# Patient Record
Sex: FEMALE | Race: WHITE | NOT HISPANIC OR LATINO
[De-identification: names, ages, dates, MRNs, and addresses within clinical notes are randomized per-mention and may not be internally consistent; named-entity substitution may affect disease eponyms.]

---

## 2017-10-02 PROBLEM — Z00.00 ENCOUNTER FOR PREVENTIVE HEALTH EXAMINATION: Status: ACTIVE | Noted: 2017-10-02

## 2017-10-17 ENCOUNTER — APPOINTMENT (OUTPATIENT)
Dept: CARDIOLOGY | Facility: CLINIC | Age: 82
End: 2017-10-17

## 2017-11-02 ENCOUNTER — APPOINTMENT (OUTPATIENT)
Age: 82
End: 2017-11-02
Payer: MEDICARE

## 2017-11-02 ENCOUNTER — NON-APPOINTMENT (OUTPATIENT)
Age: 82
End: 2017-11-02

## 2017-11-02 VITALS
WEIGHT: 131 LBS | DIASTOLIC BLOOD PRESSURE: 70 MMHG | OXYGEN SATURATION: 98 % | SYSTOLIC BLOOD PRESSURE: 120 MMHG | BODY MASS INDEX: 25.72 KG/M2 | HEIGHT: 60 IN | HEART RATE: 90 BPM

## 2017-11-02 DIAGNOSIS — Z82.3 FAMILY HISTORY OF STROKE: ICD-10-CM

## 2017-11-02 DIAGNOSIS — K21.9 GASTRO-ESOPHAGEAL REFLUX DISEASE W/OUT ESOPHAGITIS: ICD-10-CM

## 2017-11-02 DIAGNOSIS — I10 ESSENTIAL (PRIMARY) HYPERTENSION: ICD-10-CM

## 2017-11-02 DIAGNOSIS — I35.0 NONRHEUMATIC AORTIC (VALVE) STENOSIS: ICD-10-CM

## 2017-11-02 DIAGNOSIS — E78.2 MIXED HYPERLIPIDEMIA: ICD-10-CM

## 2017-11-02 PROCEDURE — 93306 TTE W/DOPPLER COMPLETE: CPT

## 2017-11-02 PROCEDURE — 99204 OFFICE O/P NEW MOD 45 MIN: CPT

## 2017-11-02 PROCEDURE — 93000 ELECTROCARDIOGRAM COMPLETE: CPT

## 2017-11-02 RX ORDER — LABETALOL HYDROCHLORIDE 200 MG/1
200 TABLET, FILM COATED ORAL
Refills: 0 | Status: ACTIVE | COMMUNITY

## 2017-11-02 RX ORDER — SIMVASTATIN 40 MG/1
40 TABLET, FILM COATED ORAL DAILY
Refills: 0 | Status: ACTIVE | COMMUNITY

## 2017-11-02 RX ORDER — AZITHROMYCIN 250 MG/1
250 TABLET, FILM COATED ORAL
Qty: 6 | Refills: 0 | Status: ACTIVE | COMMUNITY
Start: 2017-09-06

## 2017-11-02 RX ORDER — ALISKIREN HEMIFUMARATE 150 MG/1
150 TABLET, FILM COATED ORAL
Qty: 30 | Refills: 0 | Status: ACTIVE | COMMUNITY
Start: 2017-08-07

## 2017-11-06 ENCOUNTER — APPOINTMENT (OUTPATIENT)
Age: 82
End: 2017-11-06

## 2019-03-05 ENCOUNTER — EMERGENCY (EMERGENCY)
Facility: HOSPITAL | Age: 84
LOS: 1 days | Discharge: HOME | End: 2019-03-05
Attending: EMERGENCY MEDICINE
Payer: MEDICARE

## 2019-03-05 VITALS
DIASTOLIC BLOOD PRESSURE: 84 MMHG | RESPIRATION RATE: 16 BRPM | HEIGHT: 61 IN | TEMPERATURE: 98 F | SYSTOLIC BLOOD PRESSURE: 150 MMHG | HEART RATE: 84 BPM | WEIGHT: 130.07 LBS | OXYGEN SATURATION: 100 %

## 2019-03-05 DIAGNOSIS — R10.9 UNSPECIFIED ABDOMINAL PAIN: ICD-10-CM

## 2019-03-05 DIAGNOSIS — Z90.49 ACQUIRED ABSENCE OF OTHER SPECIFIED PARTS OF DIGESTIVE TRACT: ICD-10-CM

## 2019-03-05 DIAGNOSIS — I10 ESSENTIAL (PRIMARY) HYPERTENSION: ICD-10-CM

## 2019-03-05 DIAGNOSIS — R14.2 ERUCTATION: ICD-10-CM

## 2019-03-05 DIAGNOSIS — N39.0 URINARY TRACT INFECTION, SITE NOT SPECIFIED: ICD-10-CM

## 2019-03-05 LAB
ALBUMIN SERPL ELPH-MCNC: 3.8 G/DL — SIGNIFICANT CHANGE UP (ref 3.3–5)
ALP SERPL-CCNC: 114 U/L — SIGNIFICANT CHANGE UP (ref 40–120)
ALT FLD-CCNC: 17 U/L — SIGNIFICANT CHANGE UP (ref 12–78)
AMYLASE P1 CFR SERPL: 48 U/L — SIGNIFICANT CHANGE UP (ref 25–115)
ANION GAP SERPL CALC-SCNC: 10 MMOL/L — SIGNIFICANT CHANGE UP (ref 5–17)
APPEARANCE UR: CLEAR — SIGNIFICANT CHANGE UP
AST SERPL-CCNC: 23 U/L — SIGNIFICANT CHANGE UP (ref 15–37)
BASOPHILS # BLD AUTO: 0.04 K/UL — SIGNIFICANT CHANGE UP (ref 0–0.2)
BASOPHILS NFR BLD AUTO: 0.3 % — SIGNIFICANT CHANGE UP (ref 0–2)
BILIRUB SERPL-MCNC: 1.2 MG/DL — SIGNIFICANT CHANGE UP (ref 0.2–1.2)
BILIRUB UR-MCNC: NEGATIVE — SIGNIFICANT CHANGE UP
BUN SERPL-MCNC: 34 MG/DL — HIGH (ref 7–23)
CALCIUM SERPL-MCNC: 9.3 MG/DL — SIGNIFICANT CHANGE UP (ref 8.5–10.1)
CHLORIDE SERPL-SCNC: 100 MMOL/L — SIGNIFICANT CHANGE UP (ref 96–108)
CO2 SERPL-SCNC: 29 MMOL/L — SIGNIFICANT CHANGE UP (ref 22–31)
COLOR SPEC: YELLOW — SIGNIFICANT CHANGE UP
CREAT SERPL-MCNC: 1.23 MG/DL — SIGNIFICANT CHANGE UP (ref 0.5–1.3)
DIFF PNL FLD: ABNORMAL
EOSINOPHIL # BLD AUTO: 0.01 K/UL — SIGNIFICANT CHANGE UP (ref 0–0.5)
EOSINOPHIL NFR BLD AUTO: 0.1 % — SIGNIFICANT CHANGE UP (ref 0–6)
GLUCOSE SERPL-MCNC: 120 MG/DL — HIGH (ref 70–99)
GLUCOSE UR QL: NEGATIVE MG/DL — SIGNIFICANT CHANGE UP
HCT VFR BLD CALC: 45.6 % — HIGH (ref 34.5–45)
HGB BLD-MCNC: 14.4 G/DL — SIGNIFICANT CHANGE UP (ref 11.5–15.5)
IMM GRANULOCYTES NFR BLD AUTO: 0.4 % — SIGNIFICANT CHANGE UP (ref 0–1.5)
KETONES UR-MCNC: ABNORMAL
LACTATE SERPL-SCNC: 1.9 MMOL/L — SIGNIFICANT CHANGE UP (ref 0.7–2)
LEUKOCYTE ESTERASE UR-ACNC: ABNORMAL
LIDOCAIN IGE QN: 71 U/L — LOW (ref 73–393)
LYMPHOCYTES # BLD AUTO: 1.27 K/UL — SIGNIFICANT CHANGE UP (ref 1–3.3)
LYMPHOCYTES # BLD AUTO: 9.4 % — LOW (ref 13–44)
MCHC RBC-ENTMCNC: 27.1 PG — SIGNIFICANT CHANGE UP (ref 27–34)
MCHC RBC-ENTMCNC: 31.6 GM/DL — LOW (ref 32–36)
MCV RBC AUTO: 85.7 FL — SIGNIFICANT CHANGE UP (ref 80–100)
MONOCYTES # BLD AUTO: 1.12 K/UL — HIGH (ref 0–0.9)
MONOCYTES NFR BLD AUTO: 8.2 % — SIGNIFICANT CHANGE UP (ref 2–14)
NEUTROPHILS # BLD AUTO: 11.08 K/UL — HIGH (ref 1.8–7.4)
NEUTROPHILS NFR BLD AUTO: 81.6 % — HIGH (ref 43–77)
NITRITE UR-MCNC: NEGATIVE — SIGNIFICANT CHANGE UP
NRBC # BLD: 0 /100 WBCS — SIGNIFICANT CHANGE UP (ref 0–0)
PH UR: 5 — SIGNIFICANT CHANGE UP (ref 5–8)
PLATELET # BLD AUTO: 258 K/UL — SIGNIFICANT CHANGE UP (ref 150–400)
POTASSIUM SERPL-MCNC: 5 MMOL/L — SIGNIFICANT CHANGE UP (ref 3.5–5.3)
POTASSIUM SERPL-SCNC: 5 MMOL/L — SIGNIFICANT CHANGE UP (ref 3.5–5.3)
PROT SERPL-MCNC: 8 GM/DL — SIGNIFICANT CHANGE UP (ref 6–8.3)
PROT UR-MCNC: 100 MG/DL
RBC # BLD: 5.32 M/UL — HIGH (ref 3.8–5.2)
RBC # FLD: 14.2 % — SIGNIFICANT CHANGE UP (ref 10.3–14.5)
SODIUM SERPL-SCNC: 139 MMOL/L — SIGNIFICANT CHANGE UP (ref 135–145)
SP GR SPEC: 1.02 — SIGNIFICANT CHANGE UP (ref 1.01–1.02)
UROBILINOGEN FLD QL: NEGATIVE MG/DL — SIGNIFICANT CHANGE UP
WBC # BLD: 13.58 K/UL — HIGH (ref 3.8–10.5)
WBC # FLD AUTO: 13.58 K/UL — HIGH (ref 3.8–10.5)

## 2019-03-05 PROCEDURE — 99284 EMERGENCY DEPT VISIT MOD MDM: CPT

## 2019-03-05 RX ORDER — PANTOPRAZOLE SODIUM 20 MG/1
40 TABLET, DELAYED RELEASE ORAL ONCE
Qty: 0 | Refills: 0 | Status: COMPLETED | OUTPATIENT
Start: 2019-03-05 | End: 2019-03-05

## 2019-03-05 RX ORDER — ONDANSETRON 8 MG/1
4 TABLET, FILM COATED ORAL ONCE
Qty: 0 | Refills: 0 | Status: COMPLETED | OUTPATIENT
Start: 2019-03-05 | End: 2019-03-05

## 2019-03-05 RX ADMIN — Medication 30 MILLILITER(S): at 20:13

## 2019-03-05 RX ADMIN — ONDANSETRON 4 MILLIGRAM(S): 8 TABLET, FILM COATED ORAL at 20:14

## 2019-03-05 RX ADMIN — PANTOPRAZOLE SODIUM 40 MILLIGRAM(S): 20 TABLET, DELAYED RELEASE ORAL at 20:14

## 2019-03-05 NOTE — ED ADULT NURSE REASSESSMENT NOTE - NS ED NURSE REASSESS COMMENT FT1
observed in waiting area sitting with   condition unchanged. Pt waiting for placement in treatment area

## 2019-03-05 NOTE — ED PROVIDER NOTE - PHYSICAL EXAMINATION
Gen: Alert, NAD, well appearing  Head: NC, AT, PERRL, EOMI, normal lids/conjunctiva  ENT: normal hearing, patent oropharynx without erythema/exudate, uvula midline  Neck: +supple, no tenderness/meningismus/JVD, +Trachea midline  Pulm: Bilateral BS, normal resp effort, no wheeze/stridor/retractions  CV: RRR, slight systolic murmur, +dist pulses  Abd: soft, +mild generalized tenderness, no guarding, ND, +BS, no palpable masses  Mskel: no edema/erythema/cyanosis  Skin: no rash, warm/dry  Neuro: AAOx3, no apparent sensory/motor deficits, coordination intact

## 2019-03-05 NOTE — ED PROVIDER NOTE - CLINICAL SUMMARY MEDICAL DECISION MAKING FREE TEXT BOX
Patient present for abdominal discomfort.  VSS.  Patients labs show mild leukocytosis and UTI.  PAtient's pain completely resolved with maalox.  She now declines CT imaging, states that she wants to go.  Discussed obtaining CT imaging without contrast if she wants but she still refuses CT.  Will treat for UTI.   The patient has decided to leave against medical advice.  The patient is AAOx3, not intoxicated, and displays normal decision making ability. We discussed all risks, benefits, and alternatives to the progression of treatment and the potential dangers of leaving including but not limited to permanent disability, injury, and death.  The patient was instructed that they are welcome to change their decision to leave against medical advice and return to the emergency department at any time and for any reason in order to allow us to render care.  Patient to f/u with Dr. ontiveros.

## 2019-03-05 NOTE — ED PROVIDER NOTE - OBJECTIVE STATEMENT
Pertinent PMH/PSH/FHx/SHx and Review of Systems contained within:  Patient presents to the ED for abdominal pain.  Patient c/o diffuse abdominal pain x2 days, had 1 episode of nonbloody vomit before presenting to ER.  Says that she saw her PMD yesterday and had an MRI of her abdomen done but does not know results.  Spoke with her PMD Dr. Kearney who says that US was done, not MRI, and results still pending.  Patient denies diarrhea or constipation, LBM yesterday, light brown stool.  Denies chest pain, dyspnea, palpitations.    Relevant PMHx/SHx/SOCHx/FAMH:  HTN, due for aortic valve replacement, cholecystectomy  Patient denies EtOH/tobacco/illicit substance use.    ROS: No fever/chills, No headache/photophobia/eye pain/changes in vision, No ear pain/sore throat/dysphagia, No chest pain/palpitations, no SOB/cough/wheeze/stridor, No D/melena, no dysuria/frequency/discharge, No neck/back pain, no rash, no changes in neurological status/function. Pertinent PMH/PSH/FHx/SHx and Review of Systems contained within:  Patient presents to the ED for abdominal pain.  Patient c/o diffuse abdominal pain x2 days with frequent belching, had 1 episode of nonbloody vomit before presenting to ER.  Says that she saw her PMD yesterday and had an MRI of her abdomen done but does not know results.  Spoke with her PMD Dr. Kearney who says that US was done, not MRI, and results still pending.  Patient denies diarrhea or constipation, LBM yesterday, light brown stool.  Denies chest pain, dyspnea, palpitations.    Relevant PMHx/SHx/SOCHx/FAMH:  HTN, due for aortic valve replacement, cholecystectomy  Patient denies EtOH/tobacco/illicit substance use.    ROS: No fever/chills, No headache/photophobia/eye pain/changes in vision, No ear pain/sore throat/dysphagia, No chest pain/palpitations, no SOB/cough/wheeze/stridor, No D/melena, no dysuria/frequency/discharge, No neck/back pain, no rash, no changes in neurological status/function.

## 2019-03-05 NOTE — ED ADULT NURSE NOTE - NSIMPLEMENTINTERV_GEN_ALL_ED
Implemented All Universal Safety Interventions:  [place] to call system. Call bell, personal items and telephone within reach. Instruct patient to call for assistance. Room bathroom lighting operational. Non-slip footwear when patient is off stretcher. Physically safe environment: no spills, clutter or unnecessary equipment. Stretcher in lowest position, wheels locked, appropriate side rails in place.

## 2019-03-05 NOTE — ED ADULT TRIAGE NOTE - CHIEF COMPLAINT QUOTE
stomach pains onset Sunday night. She has nausea, and gas pains . She is burping. She was seen by PMD and had a MRI but the doctor office is closed today

## 2019-03-06 VITALS
SYSTOLIC BLOOD PRESSURE: 119 MMHG | DIASTOLIC BLOOD PRESSURE: 70 MMHG | TEMPERATURE: 99 F | OXYGEN SATURATION: 98 % | HEART RATE: 80 BPM | RESPIRATION RATE: 19 BRPM

## 2019-03-06 LAB
BACTERIA # UR AUTO: ABNORMAL
EPI CELLS # UR: ABNORMAL
RBC CASTS # UR COMP ASSIST: ABNORMAL /HPF (ref 0–4)
WBC UR QL: ABNORMAL

## 2019-03-06 RX ORDER — CEFTRIAXONE 500 MG/1
1 INJECTION, POWDER, FOR SOLUTION INTRAMUSCULAR; INTRAVENOUS ONCE
Qty: 0 | Refills: 0 | Status: COMPLETED | OUTPATIENT
Start: 2019-03-06 | End: 2019-03-06

## 2019-03-06 RX ORDER — CEPHALEXIN 500 MG
1 CAPSULE ORAL
Qty: 14 | Refills: 0 | OUTPATIENT
Start: 2019-03-06 | End: 2019-03-12

## 2019-03-06 RX ORDER — SUCRALFATE 1 G
10 TABLET ORAL
Qty: 600 | Refills: 0 | OUTPATIENT
Start: 2019-03-06 | End: 2019-03-19

## 2019-03-06 RX ADMIN — CEFTRIAXONE 100 GRAM(S): 500 INJECTION, POWDER, FOR SOLUTION INTRAMUSCULAR; INTRAVENOUS at 01:26

## 2019-03-07 LAB
CULTURE RESULTS: SIGNIFICANT CHANGE UP
SPECIMEN SOURCE: SIGNIFICANT CHANGE UP

## 2019-04-02 ENCOUNTER — INPATIENT (INPATIENT)
Facility: HOSPITAL | Age: 84
LOS: 4 days | DRG: 23 | End: 2019-04-07
Attending: PSYCHIATRY & NEUROLOGY | Admitting: PSYCHIATRY & NEUROLOGY
Payer: MEDICARE

## 2019-04-02 ENCOUNTER — EMERGENCY (EMERGENCY)
Facility: HOSPITAL | Age: 84
LOS: 0 days | Discharge: ANOTHER HOSPITAL | End: 2019-04-02
Attending: EMERGENCY MEDICINE
Payer: MEDICARE

## 2019-04-02 VITALS
RESPIRATION RATE: 16 BRPM | HEART RATE: 72 BPM | SYSTOLIC BLOOD PRESSURE: 203 MMHG | OXYGEN SATURATION: 99 % | DIASTOLIC BLOOD PRESSURE: 103 MMHG

## 2019-04-02 VITALS
OXYGEN SATURATION: 99 % | DIASTOLIC BLOOD PRESSURE: 88 MMHG | HEART RATE: 60 BPM | RESPIRATION RATE: 14 BRPM | TEMPERATURE: 98 F | WEIGHT: 125 LBS | HEIGHT: 60 IN | SYSTOLIC BLOOD PRESSURE: 198 MMHG

## 2019-04-02 VITALS
DIASTOLIC BLOOD PRESSURE: 95 MMHG | OXYGEN SATURATION: 100 % | SYSTOLIC BLOOD PRESSURE: 211 MMHG | RESPIRATION RATE: 16 BRPM | HEART RATE: 67 BPM

## 2019-04-02 DIAGNOSIS — I63.9 CEREBRAL INFARCTION, UNSPECIFIED: ICD-10-CM

## 2019-04-02 DIAGNOSIS — I10 ESSENTIAL (PRIMARY) HYPERTENSION: ICD-10-CM

## 2019-04-02 DIAGNOSIS — E78.5 HYPERLIPIDEMIA, UNSPECIFIED: ICD-10-CM

## 2019-04-02 DIAGNOSIS — R29.810 FACIAL WEAKNESS: ICD-10-CM

## 2019-04-02 DIAGNOSIS — I63.511 CEREBRAL INFARCTION DUE TO UNSPECIFIED OCCLUSION OR STENOSIS OF RIGHT MIDDLE CEREBRAL ARTERY: ICD-10-CM

## 2019-04-02 LAB
ALBUMIN SERPL ELPH-MCNC: 3.2 G/DL — LOW (ref 3.3–5)
ALBUMIN SERPL ELPH-MCNC: 4.2 G/DL — SIGNIFICANT CHANGE UP (ref 3.3–5)
ALP SERPL-CCNC: 87 U/L — SIGNIFICANT CHANGE UP (ref 40–120)
ALP SERPL-CCNC: 98 U/L — SIGNIFICANT CHANGE UP (ref 40–120)
ALT FLD-CCNC: 12 U/L — SIGNIFICANT CHANGE UP (ref 10–45)
ALT FLD-CCNC: 13 U/L — SIGNIFICANT CHANGE UP (ref 12–78)
ANION GAP SERPL CALC-SCNC: 14 MMOL/L — SIGNIFICANT CHANGE UP (ref 5–17)
ANION GAP SERPL CALC-SCNC: 8 MMOL/L — SIGNIFICANT CHANGE UP (ref 5–17)
APTT BLD: 31.6 SEC — SIGNIFICANT CHANGE UP (ref 28.5–37)
APTT BLD: 50.7 SEC — HIGH (ref 27.5–36.3)
AST SERPL-CCNC: 13 U/L — LOW (ref 15–37)
AST SERPL-CCNC: 17 U/L — SIGNIFICANT CHANGE UP (ref 10–40)
BASE EXCESS BLDA CALC-SCNC: 1.9 MMOL/L — SIGNIFICANT CHANGE UP (ref -2–2)
BASOPHILS # BLD AUTO: 0 K/UL — SIGNIFICANT CHANGE UP (ref 0–0.2)
BASOPHILS # BLD AUTO: 0.03 K/UL — SIGNIFICANT CHANGE UP (ref 0–0.2)
BASOPHILS NFR BLD AUTO: 0.1 % — SIGNIFICANT CHANGE UP (ref 0–2)
BASOPHILS NFR BLD AUTO: 0.5 % — SIGNIFICANT CHANGE UP (ref 0–2)
BILIRUB SERPL-MCNC: 0.6 MG/DL — SIGNIFICANT CHANGE UP (ref 0.2–1.2)
BILIRUB SERPL-MCNC: 0.7 MG/DL — SIGNIFICANT CHANGE UP (ref 0.2–1.2)
BLD GP AB SCN SERPL QL: NEGATIVE — SIGNIFICANT CHANGE UP
BLD GP AB SCN SERPL QL: SIGNIFICANT CHANGE UP
BLOOD GAS COMMENTS: SIGNIFICANT CHANGE UP
BLOOD GAS COMMENTS: SIGNIFICANT CHANGE UP
BLOOD GAS SOURCE: SIGNIFICANT CHANGE UP
BUN SERPL-MCNC: 13 MG/DL — SIGNIFICANT CHANGE UP (ref 7–23)
BUN SERPL-MCNC: 13 MG/DL — SIGNIFICANT CHANGE UP (ref 7–23)
CALCIUM SERPL-MCNC: 8.5 MG/DL — SIGNIFICANT CHANGE UP (ref 8.5–10.1)
CALCIUM SERPL-MCNC: 9.7 MG/DL — SIGNIFICANT CHANGE UP (ref 8.4–10.5)
CHLORIDE SERPL-SCNC: 103 MMOL/L — SIGNIFICANT CHANGE UP (ref 96–108)
CHLORIDE SERPL-SCNC: 107 MMOL/L — SIGNIFICANT CHANGE UP (ref 96–108)
CO2 SERPL-SCNC: 24 MMOL/L — SIGNIFICANT CHANGE UP (ref 22–31)
CO2 SERPL-SCNC: 26 MMOL/L — SIGNIFICANT CHANGE UP (ref 22–31)
CREAT SERPL-MCNC: 1.1 MG/DL — SIGNIFICANT CHANGE UP (ref 0.5–1.3)
CREAT SERPL-MCNC: 1.11 MG/DL — SIGNIFICANT CHANGE UP (ref 0.5–1.3)
EOSINOPHIL # BLD AUTO: 0.2 K/UL — SIGNIFICANT CHANGE UP (ref 0–0.5)
EOSINOPHIL # BLD AUTO: 0.21 K/UL — SIGNIFICANT CHANGE UP (ref 0–0.5)
EOSINOPHIL NFR BLD AUTO: 2 % — SIGNIFICANT CHANGE UP (ref 0–6)
EOSINOPHIL NFR BLD AUTO: 3.2 % — SIGNIFICANT CHANGE UP (ref 0–6)
ETHANOL SERPL-MCNC: <10 MG/DL — SIGNIFICANT CHANGE UP (ref 0–10)
GAS PNL BLDV: SIGNIFICANT CHANGE UP
GLUCOSE SERPL-MCNC: 124 MG/DL — HIGH (ref 70–99)
GLUCOSE SERPL-MCNC: 145 MG/DL — HIGH (ref 70–99)
HCO3 BLDA-SCNC: 27 MMOL/L — SIGNIFICANT CHANGE UP (ref 21–29)
HCT VFR BLD CALC: 34.9 % — SIGNIFICANT CHANGE UP (ref 34.5–45)
HCT VFR BLD CALC: 38.2 % — SIGNIFICANT CHANGE UP (ref 34.5–45)
HGB BLD-MCNC: 11.1 G/DL — LOW (ref 11.5–15.5)
HGB BLD-MCNC: 12.8 G/DL — SIGNIFICANT CHANGE UP (ref 11.5–15.5)
HOROWITZ INDEX BLDA+IHG-RTO: 21 — SIGNIFICANT CHANGE UP
IMM GRANULOCYTES NFR BLD AUTO: 0.2 % — SIGNIFICANT CHANGE UP (ref 0–1.5)
INR BLD: 1.07 RATIO — SIGNIFICANT CHANGE UP (ref 0.88–1.16)
INR BLD: 1.11 RATIO — SIGNIFICANT CHANGE UP (ref 0.88–1.16)
LACTATE SERPL-SCNC: 1.1 MMOL/L — SIGNIFICANT CHANGE UP (ref 0.7–2)
LYMPHOCYTES # BLD AUTO: 0.7 K/UL — LOW (ref 1–3.3)
LYMPHOCYTES # BLD AUTO: 0.72 K/UL — LOW (ref 1–3.3)
LYMPHOCYTES # BLD AUTO: 11 % — LOW (ref 13–44)
LYMPHOCYTES # BLD AUTO: 7.2 % — LOW (ref 13–44)
MCHC RBC-ENTMCNC: 27.3 PG — SIGNIFICANT CHANGE UP (ref 27–34)
MCHC RBC-ENTMCNC: 28.6 PG — SIGNIFICANT CHANGE UP (ref 27–34)
MCHC RBC-ENTMCNC: 31.8 GM/DL — LOW (ref 32–36)
MCHC RBC-ENTMCNC: 33.5 GM/DL — SIGNIFICANT CHANGE UP (ref 32–36)
MCV RBC AUTO: 85.3 FL — SIGNIFICANT CHANGE UP (ref 80–100)
MCV RBC AUTO: 86 FL — SIGNIFICANT CHANGE UP (ref 80–100)
MONOCYTES # BLD AUTO: 0.4 K/UL — SIGNIFICANT CHANGE UP (ref 0–0.9)
MONOCYTES # BLD AUTO: 0.42 K/UL — SIGNIFICANT CHANGE UP (ref 0–0.9)
MONOCYTES NFR BLD AUTO: 3.6 % — SIGNIFICANT CHANGE UP (ref 2–14)
MONOCYTES NFR BLD AUTO: 6.4 % — SIGNIFICANT CHANGE UP (ref 2–14)
NEUTROPHILS # BLD AUTO: 5.15 K/UL — SIGNIFICANT CHANGE UP (ref 1.8–7.4)
NEUTROPHILS # BLD AUTO: 8.6 K/UL — HIGH (ref 1.8–7.4)
NEUTROPHILS NFR BLD AUTO: 78.7 % — HIGH (ref 43–77)
NEUTROPHILS NFR BLD AUTO: 87.1 % — HIGH (ref 43–77)
NRBC # BLD: 0 /100 WBCS — SIGNIFICANT CHANGE UP (ref 0–0)
PCO2 BLDA: 45 MMHG — SIGNIFICANT CHANGE UP (ref 32–46)
PH BLD: 7.39 — SIGNIFICANT CHANGE UP (ref 7.35–7.45)
PLATELET # BLD AUTO: 161 K/UL — SIGNIFICANT CHANGE UP (ref 150–400)
PLATELET # BLD AUTO: 174 K/UL — SIGNIFICANT CHANGE UP (ref 150–400)
PO2 BLDA: 88 MMHG — SIGNIFICANT CHANGE UP (ref 74–108)
POTASSIUM SERPL-MCNC: 3.6 MMOL/L — SIGNIFICANT CHANGE UP (ref 3.5–5.3)
POTASSIUM SERPL-MCNC: 4 MMOL/L — SIGNIFICANT CHANGE UP (ref 3.5–5.3)
POTASSIUM SERPL-SCNC: 3.6 MMOL/L — SIGNIFICANT CHANGE UP (ref 3.5–5.3)
POTASSIUM SERPL-SCNC: 4 MMOL/L — SIGNIFICANT CHANGE UP (ref 3.5–5.3)
PROT SERPL-MCNC: 6.1 GM/DL — SIGNIFICANT CHANGE UP (ref 6–8.3)
PROT SERPL-MCNC: 6.7 G/DL — SIGNIFICANT CHANGE UP (ref 6–8.3)
PROTHROM AB SERPL-ACNC: 12.3 SEC — SIGNIFICANT CHANGE UP (ref 10–12.9)
PROTHROM AB SERPL-ACNC: 12.5 SEC — SIGNIFICANT CHANGE UP (ref 10–12.9)
RBC # BLD: 4.06 M/UL — SIGNIFICANT CHANGE UP (ref 3.8–5.2)
RBC # BLD: 4.47 M/UL — SIGNIFICANT CHANGE UP (ref 3.8–5.2)
RBC # FLD: 12.7 % — SIGNIFICANT CHANGE UP (ref 10.3–14.5)
RBC # FLD: 13.8 % — SIGNIFICANT CHANGE UP (ref 10.3–14.5)
RH IG SCN BLD-IMP: POSITIVE — SIGNIFICANT CHANGE UP
RH IG SCN BLD-IMP: POSITIVE — SIGNIFICANT CHANGE UP
SAO2 % BLDA: 97 % — HIGH (ref 92–96)
SODIUM SERPL-SCNC: 141 MMOL/L — SIGNIFICANT CHANGE UP (ref 135–145)
SODIUM SERPL-SCNC: 141 MMOL/L — SIGNIFICANT CHANGE UP (ref 135–145)
TROPONIN I SERPL-MCNC: 0.36 NG/ML — HIGH (ref 0.01–0.04)
WBC # BLD: 6.54 K/UL — SIGNIFICANT CHANGE UP (ref 3.8–10.5)
WBC # BLD: 9.9 K/UL — SIGNIFICANT CHANGE UP (ref 3.8–10.5)
WBC # FLD AUTO: 6.54 K/UL — SIGNIFICANT CHANGE UP (ref 3.8–10.5)
WBC # FLD AUTO: 9.9 K/UL — SIGNIFICANT CHANGE UP (ref 3.8–10.5)

## 2019-04-02 PROCEDURE — 70450 CT HEAD/BRAIN W/O DYE: CPT | Mod: 26,59,77,59

## 2019-04-02 PROCEDURE — 36556 INSERT NON-TUNNEL CV CATH: CPT

## 2019-04-02 PROCEDURE — 0042T: CPT

## 2019-04-02 PROCEDURE — 61645 PERQ ART M-THROMBECT &/NFS: CPT | Mod: RT

## 2019-04-02 PROCEDURE — 93010 ELECTROCARDIOGRAM REPORT: CPT

## 2019-04-02 PROCEDURE — 99285 EMERGENCY DEPT VISIT HI MDM: CPT

## 2019-04-02 PROCEDURE — 99292 CRITICAL CARE ADDL 30 MIN: CPT

## 2019-04-02 PROCEDURE — 70496 CT ANGIOGRAPHY HEAD: CPT | Mod: 26

## 2019-04-02 PROCEDURE — 99223 1ST HOSP IP/OBS HIGH 75: CPT

## 2019-04-02 PROCEDURE — 70450 CT HEAD/BRAIN W/O DYE: CPT | Mod: 26,59,77

## 2019-04-02 PROCEDURE — 36223 PLACE CATH CAROTID/INOM ART: CPT | Mod: 59,LT

## 2019-04-02 PROCEDURE — 70498 CT ANGIOGRAPHY NECK: CPT | Mod: 26

## 2019-04-02 PROCEDURE — 99291 CRITICAL CARE FIRST HOUR: CPT | Mod: 25

## 2019-04-02 PROCEDURE — 99291 CRITICAL CARE FIRST HOUR: CPT

## 2019-04-02 PROCEDURE — 70450 CT HEAD/BRAIN W/O DYE: CPT | Mod: 26,59

## 2019-04-02 RX ORDER — SODIUM CHLORIDE 5 G/100ML
500 INJECTION, SOLUTION INTRAVENOUS
Qty: 0 | Refills: 0 | Status: DISCONTINUED | OUTPATIENT
Start: 2019-04-02 | End: 2019-04-03

## 2019-04-02 RX ORDER — LABETALOL HCL 100 MG
10 TABLET ORAL ONCE
Qty: 0 | Refills: 0 | Status: DISCONTINUED | OUTPATIENT
Start: 2019-04-02 | End: 2019-04-02

## 2019-04-02 RX ORDER — MANNITOL
60 POWDER (GRAM) MISCELLANEOUS ONCE
Qty: 0 | Refills: 0 | Status: COMPLETED | OUTPATIENT
Start: 2019-04-02 | End: 2019-04-02

## 2019-04-02 RX ADMIN — Medication 1200 GRAM(S): at 21:55

## 2019-04-02 NOTE — PROGRESS NOTE ADULT - SUBJECTIVE AND OBJECTIVE BOX
86-year-old lady presented with left hemiplegia, clinically due to a large right MCA infarct, with right ICA occlusion and right M1 occlusion. She has an acute life-threatening condition and is unable to provide informed consent for treatment. Endovascular thrombectomy is a potentially life saving procedure and this is a physician consent to proceed.

## 2019-04-02 NOTE — ED ADULT NURSE NOTE - OBJECTIVE STATEMENT
Assumed care of patient at this time with complaint of left sided weakness and left sided facial droop that started @1230 when she was driving. At this time the patient is sleepy but arousable and able to answer questions. Pt blood pressure elevated and taken to Ct scan.

## 2019-04-02 NOTE — ED ADULT NURSE NOTE - NSIMPLEMENTINTERV_GEN_ALL_ED
Implemented All Fall Risk Interventions:  [place] to call system. Call bell, personal items and telephone within reach. Instruct patient to call for assistance. Room bathroom lighting operational. Non-slip footwear when patient is off stretcher. Physically safe environment: no spills, clutter or unnecessary equipment. Stretcher in lowest position, wheels locked, appropriate side rails in place. Provide visual cue, wrist band, yellow gown, etc. Monitor gait and stability. Monitor for mental status changes and reorient to person, place, and time. Review medications for side effects contributing to fall risk. Reinforce activity limits and safety measures with patient and family.

## 2019-04-02 NOTE — ED ADULT NURSE NOTE - NSIMPLEMENTINTERV_GEN_ALL_ED
Implemented All Fall Risk Interventions:  [place] to call system. Call bell, personal items and telephone within reach. Instruct patient to call for assistance. Room bathroom lighting operational. Non-slip footwear when patient is off stretcher. Physically safe environment: no spills, clutter or unnecessary equipment. Stretcher in lowest position, wheels locked, appropriate side rails in place. Provide visual cue, wrist band, yellow gown, etc. Monitor gait and stability. Monitor for mental status changes and reorient to person, place, and time. Review medications for side effects contributing to fall risk. Reinforce activity limits and safety measures with patient and family.

## 2019-04-02 NOTE — ED PROVIDER NOTE - OBJECTIVE STATEMENT
85yo female with htn presents with altered mental status as per . Last known well time between 11:50 and 12:30 pm this afternoon.

## 2019-04-02 NOTE — PROCEDURE NOTE - NSINDICATIONS_GEN_A_CORE
critical illness/emergency venous access/venous access/hypertonic/irritant infusion critical illness/emergency venous access/venous access/Occlusion of intracranial artery with cerebral infarction/hypertonic/irritant infusion

## 2019-04-02 NOTE — PROGRESS NOTE ADULT - SUBJECTIVE AND OBJECTIVE BOX
86y Female PMH HTN HLD tx from [place] w/ L weakness, L facial droop, R gaze palsy, L neglect. LKW 1150. Patient was driving her car when symptoms started, went to  ED where she was found w/ occlusion of right internal carotid and middle cerebral arteries starting at the bulb, NIHSS 17. Patient functional at baseline, able to handle ADLs without issue. Transferred to [facility] for possible endovascular intervention, TPA not given due to outside of window 85yo woman transferred from [facility] where she was found to have R IC/MCA occlusion, outside time window for tPA, taken to angio, unsuccessful recanalization. Intraop, unable to access through R groin due to tortuous vessels, carotid artery accessed, c/b neck hematoma, which stabilized with manual compression. PMH HTN/HLD, LWK around 11am on 4/2, per  at bedside, patient drove home, where she didn't park her car right and was struggling to open the front door, when he saw her "not right" and with facial droop. BIBA to [facility] ED. NIHSS 18.     Immediately post angio around 9pm on 4/2, patient found to have R 5mm NR pupil, L 3mm R, no movements on all four extremities on propofol, which was stopped, became hypotensive, cardene d/c'ed, was on syed, given mannitol 60mg IV, unable to get IV access, emergent L fem TLC placed, taken to CTH, which showed small R contrast extravasation vs. heme but otherwise no significant edema/MLS.     +vomiting, NGT to low wall suction    Around 3am on 4/3, multiple runs of vtachy, and hypotensive, cardene held, amio 150mg push given.     Discussed with  at bedside, he agreed to DNR, but full medical management at this time. Will call his children and patient's friends to inform tonight.     Vitals/labs/meds/imaging reviewed  intubated  PERRL, R gaze preference  RUE LC, LUE trace flexion  RLE WD and consistently able to wiggle toes to command, LLE TF  bradycardic and irregular, 3/6 blowing systolic murmur RUSB  clear lungs  soft abd/nd  wwp ext; LLE DP pulse 1+, R DP pulse only dopplerable  RLE groin c/d/i, no hematoma  R neck hematoma soft, no expansion 87yo woman transferred from [facility] where she was found to have R IC/MCA occlusion, outside time window for tPA, taken to angio, unsuccessful recanalization. Intraop, unable to access through R groin due to tortuous vessels, carotid artery accessed, c/b neck hematoma, which stabilized with manual compression. PMH HTN/HLD, LWK around 11am on 4/2, per  at bedside, patient drove home, where she didn't park her car right and was struggling to open the front door, when he saw her "not right" and with facial droop. BIBA to [facility] ED. NIHSS 18.     Immediately post angio around 9pm on patient found to have R 5mm NR pupil, L 3mm R, no movements on all four extremities on propofol, which was stopped, became hypotensive, cardene d/c'ed, was on syed, given mannitol 60mg IV, unable to get IV access, emergent L fem TLC placed, taken to CTH, which showed small R contrast extravasation vs. heme but otherwise no significant edema/MLS.     +vomiting, NGT to low wall suction    multiple runs of vtachy, and hypotensive, cardene held, amio 150mg push given.    Discussed with  at bedside, he agreed to DNR, but full medical management at this time. Will call his children and patient's friends to inform tonight.     Vitals/labs/meds/imaging reviewed  intubated  PERRL, R gaze preference  RUE LC, LUE trace flexion  RLE WD and consistently able to wiggle toes to command, LLE TF  bradycardic and irregular, 3/6 blowing systolic murmur RUSB  clear lungs  soft abd/nd  wwp ext; LLE DP pulse 1+, R DP pulse only dopplerable  RLE groin c/d/i, no hematoma  R neck hematoma soft, no expansion

## 2019-04-02 NOTE — PROGRESS NOTE ADULT - SUBJECTIVE AND OBJECTIVE BOX
Interventional Neuro Radiology  Pre-Procedure Note     HPI:86y Female PMH HTN HLD ?Gastric ulcers tx from [place] w/ L weakness, L facial droop, R gaze palsy, L neglect. LKW 1150 4/2/19. Patient was driving her car when symptoms started, went to  ED where she was found w/ occlusion of right internal carotid and middle cerebral arteries starting at the bulb, NIHSS 17. Patient functional at baseline, able to handle ADLs without issue. Transferred to [facility] for possible endovascular intervention, TPA not given due to outside of window    Neuro Exam: lethargic, eyes closed, arousal to voice, oriented x person, place, and time but nearly unintelligible due to severe dysarthria,  able to follow simple commands. left gaze palsy, left facial droop, LHHA, decreased sensation on left, left neglect     PAST MEDICAL & SURGICAL HISTORY:  HLD (hyperlipidemia)  HTN (hypertension)  ? gastric ulcers     No significant past surgical history  cholecystectomy ?    Social History: per  no smoking or etoh use     Allergies: No Known Allergies      Labs:                         12.8   9.9   )-----------( 174      ( 02 Apr 2019 18:07 )             38.2       04-02    141  |  103  |  13  ----------------------------<  145<H>  4.0   |  24  |  1.10    Ca    9.7      02 Apr 2019 18:07    TPro  6.7  /  Alb  4.2  /  TBili  0.7  /  DBili  x   /  AST  17  /  ALT  12  /  AlkPhos  98  04-02      Blood Bank: 04-02-19  O  --  Positive      Assessment/Plan:   86 years old woman was transferred from OSH to [facility] for acute onset of left-sided weakness with last known well time being 11:50 AM 4/2. NIHSS being 18. CT brain admission showed early changes of ischemia in the right MCA distribution predominantly involving deep  territories. CTA head and neck at OSH  showed occlusion of right MCA M1 segment, supraclinoid ICA (intracranial "L") with very sluggish flow in the right proximal ICA probably due to distal intracranial occlusion. Patient presents to neuro-IR for selective cerebral angiography and mechanical thrombectomy.    As patient is unable to provide informed consent and no immediate family member able to be reached,  2 physicians consent for mechanical thrombectomy obtained as noted in chart.

## 2019-04-02 NOTE — ED ADULT NURSE NOTE - OBJECTIVE STATEMENT
87 y/o female hx HTN, HLD presents to the ED via EMS transfer from [facility] c/o left sided weakness, slurred speech since 11:50am. EMS states pt transferred, no TPA due to being outside the window, displayed left sided weakness, left facial droop, slurred speech, last known well at 11:50am. Denies fever, chills, n/v, abd pain, headache, dizziness, blurred vision, diarrhea/constipation, urinary s/s. Pt A&Ox2, in no respiratory distress, no CP, left side upper and lower extremity immobile, -sensation to left side with left sided facial droop and gaze, NIHSS scale 18, right sided +5/5 strength in upper/lower, +sensation to left side, PERRL, 3mm b/l. Reflexes intact. Pt taken to CT scan, neuro MD denied BP intervention due to transport to IR. Dysphagia not completed due to NPO for IR procedure. Pt transported to IR with neuro MD and ED tech.

## 2019-04-02 NOTE — PROGRESS NOTE ADULT - SUBJECTIVE AND OBJECTIVE BOX
Interventional Neuro- Radiology   Procedure Note     Procedure:Selective Cerebral Angiography   Pre- Procedure Diagnosis: occlusion of right MCA M1 segment, supraclinoid ICA  Post- Procedure Diagnosis: occlusion of right MCA M1 segment, supraclinoid ICA    : Dr. Baugh, Dr. Andre Betancourt    Physician Assistant: SAMMY PrescottC    RN: Uma Angel     Anesthesia:    (general anesthesia)    Sheath: Right Groin: 6 Hungarian Neuronmax Right carotid: 6 Fr araflex     I/Os:  Fluids: 600cc   Mcgee: 1500cc   Contrast: 67cc   Estimated Blood Loss: 400cc     Antibiotics: 2g Ancef     Vitals: 125/78 Hr 45 Intubated     Preliminary Report:  Under general anesthesia examination of right CCA, left CCA, right ICA, right MCA , right illiac via selective cerebral angiography demonstrates  Right ICA and Right MCA occlusion, embolectomy by transcarotid puncture (due to unsuccessful transfemoral access due to severe vessel tortuosity with clot retrieved but vessel remained occluded from significant clot burden  . ( Official note to follow).    Patient tolerated procedure well, vital signs as noted above,  patient remains intubated at this time and will remain so in ICU for close monitoring of carotid puncture site.    Results discussed with  and ICU team (PA and MD as well as RN)  Groin catheter d/c'ed at 2040, Right carotid catheter removed at 2030, manual compression held to hemostasis, no active bleeding, no hematoma at groin, soft throughout in groin, right carotid with fluctuance not exceeding marked regions, no hematoma expansion, + pedal pulses, quick clot and safeguard balloon dressing applied at  2045.  STAT labs, CBC/BMP 0000  Patient transfered to ICU. Interventional Neuro- Radiology   Procedure Note     Procedure:Selective Cerebral Angiography   Pre- Procedure Diagnosis: occlusion of right MCA M1 segment, supraclinoid ICA  Post- Procedure Diagnosis: occlusion of right MCA M1 segment, supraclinoid ICA    : Dr. Baugh, Dr. Andre Betancourt    Physician Assistant: Kelly Manuel PA-C    RN: Uma Angel     Anesthesia:    (general anesthesia)    Sheath: Right Groin: 6 [place] Neuronmax Right carotid: 6 Fr araflex     I/Os:  Fluids: 600cc   Mcgee: 1500cc   Contrast: 67cc   Estimated Blood Loss: 400cc     Antibiotics: 2g Ancef     Vitals: 125/78 Hr 45 Intubated     Preliminary Report:  Under general anesthesia examination of right CCA, left CCA, right ICA, right MCA , right illiac via selective cerebral angiography demonstrates  Right ICA and Right MCA occlusion, embolectomy by transcarotid puncture (due to unsuccessful transfemoral access due to severe vessel tortuosity with clot retrieved but vessel remained occluded from significant clot burden  . ( Official note to follow).    Patient tolerated procedure well, vital signs as noted above,  patient remains intubated at this time and will remain so in ICU for close monitoring of carotid puncture site.    Results discussed with  and ICU team (PA and MD as well as RN), ICU to obtain vascular consult to ensure stability of carotid site, SBP control as noted   Groin catheter d/c'ed at 2040, Right carotid catheter removed at 2030, manual compression held to hemostasis, no active bleeding, no hematoma at groin, soft throughout in groin, right carotid with fluctuance not exceeding marked regions, no hematoma expansion, + pedal pulses, quick clot and safeguard balloon dressing applied at  2045.  STAT labs, CBC/BMP 0000  Patient transfered to ICU.

## 2019-04-02 NOTE — ED ADULT NURSE REASSESSMENT NOTE - NS ED NURSE REASSESS COMMENT FT1
pt with elevated blood pressure 203/103 Dr Romero aware and no treatment at this time. Pt transfer to [facility] for higher level of care

## 2019-04-02 NOTE — PROGRESS NOTE ADULT - SUBJECTIVE AND OBJECTIVE BOX
86 years old woman was transferred from OSH to [facility] for acute onset of left-sided weakness with last known well time being 11:50 AM. She was noted to have left hemiplegia, left facial droop, partial left gaze palsy, severe dysarthria and left nixon-inattention/extinction with NIHSS being 18. CT brain admission showed early changes of ischemia in the right MCA distribution predominantly involving deep  territories. CTA head and neck at OSH to my eye showed occlusion of right MCA M1 segment, supraclinoid ICA (intracranial "L") with very sluggish flow in the right proximal ICA probably due to distal intracranial occlusion. CT perfusion showed small core infarct of 4 mL. Benefits of undergoing mechanical thrombectomy in this patient with clinical-radiological mismatch and based on results of THERON clinical trial would outweigh potential risks. As patient is unable to provide informed consent and no immediate family member is able to be reached, would proceed with 2 physicians consent for mechanical thrombectomy.

## 2019-04-02 NOTE — ED ADULT TRIAGE NOTE - CHIEF COMPLAINT QUOTE
CODE STRO[facility]  [facility]  Slurred speech today, not a TPA candidate unknown time of onset.

## 2019-04-02 NOTE — CONSULT NOTE ADULT - ATTENDING COMMENTS
VASCULAR NEUROLOGY ATTENDING  The patient is seen and examined the history and imaging are reviewed. I agree with the resident note unless otherwise noted. Patient with presumed cardioembolic  R IC/MCA occlusion, outside time window for tPA, taken to angio, unsuccessful recanalization. Intraop, unable to access through R groin due to tortuous vessels, carotid artery accessed, c/b neck hematoma, which stabilized with manual compression. PMH HTN/HLD, LWK around 11am on 4/2. Immediately post angio around 9pm on patient found to have R 5mm NR pupil, L 3mm R, no movements on all four extremities on propofol, which was stopped, became hypotensive, cardene d/c'ed, was on syed, given mannitol 60mg  CTH, which showed small R contrast extravasation vs. heme but otherwise no significant edema/MLS.   Also, noted to have multiple runs of vtachy, and hypotensive, cardene held, amio 150mg push given.  Currently intubated not following commands. no noted spontaneous movement. R Pupil reported as non-reactive. Urgect CT head performed. Mannitol given. Further work-up dependant on findinds.

## 2019-04-02 NOTE — ED PROVIDER NOTE - CLINICAL SUMMARY MEDICAL DECISION MAKING FREE TEXT BOX
Dr. Tinsley (Attending Physician) Dr. Tinsley (Attending Physician)  Left hemiplegia with right mca occlusion, code stroke called, htn permissive, will CT perfusion and CT brain.  CTA already done. Stroke at bedside.

## 2019-04-02 NOTE — CONSULT NOTE ADULT - SUBJECTIVE AND OBJECTIVE BOX
Neurology Consult    86y Female PMH HTN HLD tx from [place] w/ L weakness, L facial droop, R gaze palsy, L neglect. LKW 1150. Patient was driving her car when symptoms started, went to  ED where she was found w/ occlusion of right internal carotid and middle cerebral arteries starting at the bulb, NIHSS 17. Patient functional at baseline, able to handle ADLs without issue. Transferred to [facility] for possible endovascular intervention, TPA not given due to outside of window    REVIEW OF SYSTEMS:  As above    MEDICATIONS    PMH:      PSH:     FAMILY HISTORY:    No history of dementia, strokes, or seizures     SOCIAL HISTORY:  No history of tobacco or alcohol use     Allergies    No Known Allergies    Intolerances        Vital Signs Last 24 Hrs  T(C): --  T(F): --  HR: --  BP: --  BP(mean): --  RR: --  SpO2: --    Neurological Examination:    Mental Status: Patient is somnolent, awake, oriented X3. Patient is fluent but severely dysarthric, no motor/sensory aphasia     Cranial Nerves: PERRL, EOMI, visual field intact, V1-V3 intact, no gross facial asymmetry, tongue/uvula midline    Motor Exam: No drift  Right upper extremity: 5/5  Left upper extremity: 0/5  Right lower extremity: 5/5  Left lower extremity: 0/5    Normal bulk/tone    Sensory: Decreased to LT on L w/ L hemineglect, not recognizing her own hand    Coordination: Finger to nose intact on R, unable to participate on left    Reflexes: Bilateral 2+ Biceps, Brachial, Patellar, Ankle  Plantar: Down bilateral    GENERAL: No acute distress  HEENT:  Normocephalic, atraumatic  EXTREMITIES: No edema, clubbing, cyanosis  MUSCULOSKELETAL: Normal range of motion  SKIN: No rashes    LABS:              Hemoglobin A1C:           RADIOLOGY:  CT head:  MRI:

## 2019-04-02 NOTE — ED PROVIDER NOTE - CARE PLAN
Principal Discharge DX:	Cerebrovascular accident (CVA) due to occlusion of right middle cerebral artery

## 2019-04-02 NOTE — ED ADULT NURSE REASSESSMENT NOTE - NS ED NURSE REASSESS COMMENT FT1
IR called 3 times with no response for report. Pt transported to IR prior to RN report. Neuro MD denies BP intervention prior to pt transport to IR. Pt transported with neuro MD and quentin transporter at bedside.
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
## 2019-04-02 NOTE — PROGRESS NOTE ADULT - ASSESSMENT
R IC/MCA occlusion on CTA, no tPA, unsuccessful revascularization; NIHSS 18  ?intracranial atherosclerosis    Neuro:  CTH in am  stroke core measures  statin  MRI  Na goal 145-155  monitor neck hematoma  bedrest    Card: likely mod-sev AS  TTE  -160  d/c nicardipine gtt  hydralazine PRN    Pulm:  intubated    GI:  NGT to low wall suction, NPO    Renal:  3% for Na 145-155    Endo:  maintain euglycemia  A1C, lipid panel pending    Heme/onc:  SCDs, hold chemoppx fresh post op    ID:  monitor for fevers    DNR  ICU    at risk for deterioration and death due to acute stroke, cytotoxic edema, ventricular arrhythmia, cardiac arrest  critical care time 60min on 4/2/19  and additional 45min at the time of finishing this documentation on 4/3/19 at 3:28 am R IC/MCA occlusion on CTA, no tPA, unsuccessful revascularization; NIHSS 18  ?intracranial atherosclerosis    Neuro:  CTH in am  stroke core measures  statin  MRI  Na goal 145-155  monitor neck hematoma  bedrest    Card: likely mod-sev AS  TTE  -160  d/c nicardipine gtt  hydralazine PRN  amio 150mg given x2, start drip    Pulm:  intubated    GI:  NGT to low wall suction, NPO    Renal:  3% for Na 145-155    Endo:  maintain euglycemia  A1C, lipid panel pending    Heme/onc:  SCDs, hold chemoppx fresh post op    ID:  monitor for fevers    DNR  ICU    at risk for deterioration and death due to acute stroke, cytotoxic edema, ventricular arrhythmia, cardiac arrest  critical care time 60min on 4/2/19  and additional 45min at the time of finishing this documentation on 4/3/19 at 3:28 am R IC/MCA occlusion on CTA, no tPA, unsuccessful revascularization; NIHSS 18  ?intracranial atherosclerosis    Neuro:  CTH in am  stroke core measures  statin  MRI  Na goal 145-155  monitor neck hematoma  bedrest    Card: likely mod-sev AS  TTE  -160  d/c nicardipine gtt  hydralazine PRN  amio 150mg given x2, start drip    Pulm:  intubated    GI:  NGT to low wall suction, NPO    Renal:  3% for Na 145-155    Endo:  maintain euglycemia  A1C, lipid panel pending    Heme/onc:  SCDs, hold chemoppx fresh post op    ID:  monitor for fevers    DNR  ICU    at risk for deterioration and death due to acute stroke, cytotoxic edema, ventricular arrhythmia, cardiac arrest  critical care time 90min

## 2019-04-02 NOTE — STROKE CODE NOTE - NIH STROKE SCALE: 10. DYSARTHRIA, QM
(2) Severe dysarthria; patients speech is so slurred as to be unintelligible in the absence of or out of proportion to any dysphasia, or is mute/anarthric
crying

## 2019-04-02 NOTE — CONSULT NOTE ADULT - SUBJECTIVE AND OBJECTIVE BOX
VASCULAR SURGERY CONSULT NOTE  --------------------------------------------------------------------------------------------    HPI:   Patient is a 86y old  Female who presents with a chief complaint of Stroke, left sided weakness.  Patient was found to have an occlusion in the R ICA to MCA.  Patient underwent emergent procedure with neuro IR for thrombectomy.  Per IR prelim procedure note, R groin access was attempted but unable to reach lesion, so R carotid access obtained with 6Fr sheath.  At the end of the procedure, hemostasis was achieved with manual compression.      In NSCU, vascular surgery consulted as per IR recs for monitoring of carotid site.  No concerns from NSCU about carotid site, no expansion noted.  Patient remains intubated currently.      ROS: 10-system review is otherwise negative except HPI above.      PAST MEDICAL & SURGICAL HISTORY:  HLD (hyperlipidemia)  HTN (hypertension)  Hyperlipidemia  Hypertension  No significant past surgical history    FAMILY HISTORY:  unable to be obtained     SOCIAL HISTORY:    unable to be obtained    ALLERGIES: No Known Allergies    CURRENT MEDICATIONS  MEDICATIONS (STANDING): mannitol 20% IVPB 60 Gram(s) IV Intermittent once  sodium chloride 3%. 500 milliLiter(s) IV Continuous <Continuous>    MEDICATIONS (PRN):  --------------------------------------------------------------------------------------------    Vitals:   T(C): 33.4 (04-02-19 @ 22:00), Max: 36.7 (04-02-19 @ 16:03)  HR: 54 (04-02-19 @ 22:45) (49 - 72)  BP: 213/92 (04-02-19 @ 18:26) (191/104 - 215/88)  RR: 20 (04-02-19 @ 22:45) (11 - 29)  SpO2: 100% (04-02-19 @ 22:45) (98% - 100%)  POCT Blood Glucose.: 130 mg/dL (02 Apr 2019 17:49)  POCT Blood Glucose.: 118 mg/dL (02 Apr 2019 16:05)    Height (cm): 152.4 (04-02 @ 16:03)  Weight (kg): 55 (04-02 @ 22:03)  BMI (kg/m2): 23.7 (04-02 @ 22:03)  BSA (m2): 1.51 (04-02 @ 22:03)    PHYSICAL EXAM:   General: intubated  Neuro: somnelent  HEENT: NC/AT  Neck: Soft, R neck with dressing over carotid puncture site, overlying hematoma about 3cm, no pulsatile mass  Cardio: S1/S2  Resp: mechanically ventilated  GI/Abd: Soft, NT/ND  Vascular: All 4 extremities warm, B/l radial pulses palpable, R groin with pressure dressing in place over puncture site, b/l DP weakly palpable  Skin: Intact, no breakdown  Musculoskeletal: All 4 extremities moving spontaneously, no limitations.   --------------------------------------------------------------------------------------------    LABS  CBC (04-02 @ 18:07)                              12.8                           9.9     )----------------(  174        87.1<H>% Neutrophils, 7.2<L>% Lymphocytes, ANC: 8.6<H>                              38.2    CBC (04-02 @ 17:00)                              11.1<L>                         6.54    )----------------(  161        78.7<H>% Neutrophils, 11.0<L>% Lymphocytes, ANC: 5.15                                34.9      BMP (04-02 @ 18:07)             141     |  103     |  13    		Ca++ --      Ca 9.7                ---------------------------------( 145<H>		Mg --                 4.0     |  24      |  1.10  			Ph --      BMP (04-02 @ 17:00)             141     |  107     |  13    		Ca++ --      Ca 8.5                ---------------------------------( 124<H>		Mg --                 3.6     |  26      |  1.11  			Ph --        LFTs (04-02 @ 18:07)      TPro 6.7 / Alb 4.2 / TBili 0.7 / DBili -- / AST 17 / ALT 12 / AlkPhos 98  LFTs (04-02 @ 17:00)      TPro 6.1 / Alb 3.2<L> / TBili 0.6 / DBili -- / AST 13<L> / ALT 13 / AlkPhos 87    Coags (04-02 @ 18:07)  aPTT 50.7<H> / INR 1.07 / PT 12.3  Coags (04-02 @ 17:00)  aPTT 31.6 / INR 1.11 / PT 12.5    ABG (04-02 @ 17:04)      / 45 / 88 / 27 / 1.9 / 97<H>%     Lactate:    ABG (04-02 @ 17:00)      /  /  /  /  / %     Lactate:  1.1    VBG (04-02 @ 23:06)     7.57<H> / 23<L> / 42 / 21 / 0.2 / 86%     Lactate: 1.9    --------------------------------------------------------------------------------------------    IMAGING  no new imaging      Vascular Surgery, #[id number]

## 2019-04-02 NOTE — ED PROVIDER NOTE - OBJECTIVE STATEMENT
Dr. Tinsley (Attending Physician)  Pt. with right mca occlusion on CTA at [facility], unknown last known normal presented to [facility] with left sided hemiparesis.  TPA not given. Transferred for possible neurointerventional procedure.

## 2019-04-02 NOTE — ED ADULT NURSE NOTE - CHIEF COMPLAINT QUOTE
[place] [facility]  [facility]  Slurred speech today, not a TPA candidate unknown time of onset.

## 2019-04-03 LAB
ANION GAP SERPL CALC-SCNC: 12 MMOL/L — SIGNIFICANT CHANGE UP (ref 5–17)
ANION GAP SERPL CALC-SCNC: 12 MMOL/L — SIGNIFICANT CHANGE UP (ref 5–17)
ANION GAP SERPL CALC-SCNC: 16 MMOL/L — SIGNIFICANT CHANGE UP (ref 5–17)
ANION GAP SERPL CALC-SCNC: 18 MMOL/L — HIGH (ref 5–17)
APPEARANCE UR: CLEAR — SIGNIFICANT CHANGE UP
APTT BLD: 26.9 SEC — LOW (ref 27.5–36.3)
BASOPHILS # BLD AUTO: 0 K/UL — SIGNIFICANT CHANGE UP (ref 0–0.2)
BASOPHILS NFR BLD AUTO: 0.2 % — SIGNIFICANT CHANGE UP (ref 0–2)
BILIRUB UR-MCNC: NEGATIVE — SIGNIFICANT CHANGE UP
BUN SERPL-MCNC: 12 MG/DL — SIGNIFICANT CHANGE UP (ref 7–23)
BUN SERPL-MCNC: 13 MG/DL — SIGNIFICANT CHANGE UP (ref 7–23)
BUN SERPL-MCNC: 13 MG/DL — SIGNIFICANT CHANGE UP (ref 7–23)
BUN SERPL-MCNC: 14 MG/DL — SIGNIFICANT CHANGE UP (ref 7–23)
CALCIUM SERPL-MCNC: 8.9 MG/DL — SIGNIFICANT CHANGE UP (ref 8.4–10.5)
CALCIUM SERPL-MCNC: 9.3 MG/DL — SIGNIFICANT CHANGE UP (ref 8.4–10.5)
CHLORIDE SERPL-SCNC: 103 MMOL/L — SIGNIFICANT CHANGE UP (ref 96–108)
CHLORIDE SERPL-SCNC: 110 MMOL/L — HIGH (ref 96–108)
CHLORIDE SERPL-SCNC: 116 MMOL/L — HIGH (ref 96–108)
CHLORIDE SERPL-SCNC: 122 MMOL/L — HIGH (ref 96–108)
CHOLEST SERPL-MCNC: 166 MG/DL — SIGNIFICANT CHANGE UP (ref 10–199)
CK MB BLD-MCNC: 3.4 % — SIGNIFICANT CHANGE UP (ref 0–3.5)
CK MB CFR SERPL CALC: 3.4 NG/ML — SIGNIFICANT CHANGE UP (ref 0–3.8)
CK SERPL-CCNC: 100 U/L — SIGNIFICANT CHANGE UP (ref 25–170)
CO2 SERPL-SCNC: 18 MMOL/L — LOW (ref 22–31)
CO2 SERPL-SCNC: 19 MMOL/L — LOW (ref 22–31)
CO2 SERPL-SCNC: 21 MMOL/L — LOW (ref 22–31)
CO2 SERPL-SCNC: 22 MMOL/L — SIGNIFICANT CHANGE UP (ref 22–31)
COLOR SPEC: COLORLESS — SIGNIFICANT CHANGE UP
CREAT SERPL-MCNC: 1 MG/DL — SIGNIFICANT CHANGE UP (ref 0.5–1.3)
CREAT SERPL-MCNC: 1.16 MG/DL — SIGNIFICANT CHANGE UP (ref 0.5–1.3)
CREAT SERPL-MCNC: 1.35 MG/DL — HIGH (ref 0.5–1.3)
CREAT SERPL-MCNC: 1.54 MG/DL — HIGH (ref 0.5–1.3)
DIFF PNL FLD: ABNORMAL
EOSINOPHIL # BLD AUTO: 0 K/UL — SIGNIFICANT CHANGE UP (ref 0–0.5)
EOSINOPHIL NFR BLD AUTO: 0.3 % — SIGNIFICANT CHANGE UP (ref 0–6)
GAS PNL BLDA: SIGNIFICANT CHANGE UP
GLUCOSE SERPL-MCNC: 118 MG/DL — HIGH (ref 70–99)
GLUCOSE SERPL-MCNC: 154 MG/DL — HIGH (ref 70–99)
GLUCOSE SERPL-MCNC: 186 MG/DL — HIGH (ref 70–99)
GLUCOSE SERPL-MCNC: 227 MG/DL — HIGH (ref 70–99)
GLUCOSE UR QL: NEGATIVE — SIGNIFICANT CHANGE UP
HCT VFR BLD CALC: 28.2 % — LOW (ref 34.5–45)
HCT VFR BLD CALC: 33.4 % — LOW (ref 34.5–45)
HDLC SERPL-MCNC: 37 MG/DL — LOW
HGB BLD-MCNC: 11.6 G/DL — SIGNIFICANT CHANGE UP (ref 11.5–15.5)
HGB BLD-MCNC: 9.6 G/DL — LOW (ref 11.5–15.5)
INR BLD: 1.13 RATIO — SIGNIFICANT CHANGE UP (ref 0.88–1.16)
KETONES UR-MCNC: NEGATIVE — SIGNIFICANT CHANGE UP
LEUKOCYTE ESTERASE UR-ACNC: NEGATIVE — SIGNIFICANT CHANGE UP
LIPID PNL WITH DIRECT LDL SERPL: 109 MG/DL — HIGH
LYMPHOCYTES # BLD AUTO: 0.7 K/UL — LOW (ref 1–3.3)
LYMPHOCYTES # BLD AUTO: 6.9 % — LOW (ref 13–44)
MAGNESIUM SERPL-MCNC: 1.6 MG/DL — SIGNIFICANT CHANGE UP (ref 1.6–2.6)
MAGNESIUM SERPL-MCNC: 2.2 MG/DL — SIGNIFICANT CHANGE UP (ref 1.6–2.6)
MAGNESIUM SERPL-MCNC: 2.4 MG/DL — SIGNIFICANT CHANGE UP (ref 1.6–2.6)
MAGNESIUM SERPL-MCNC: 2.7 MG/DL — HIGH (ref 1.6–2.6)
MCHC RBC-ENTMCNC: 29.4 PG — SIGNIFICANT CHANGE UP (ref 27–34)
MCHC RBC-ENTMCNC: 29.8 PG — SIGNIFICANT CHANGE UP (ref 27–34)
MCHC RBC-ENTMCNC: 34.2 GM/DL — SIGNIFICANT CHANGE UP (ref 32–36)
MCHC RBC-ENTMCNC: 34.7 GM/DL — SIGNIFICANT CHANGE UP (ref 32–36)
MCV RBC AUTO: 84.7 FL — SIGNIFICANT CHANGE UP (ref 80–100)
MCV RBC AUTO: 87.2 FL — SIGNIFICANT CHANGE UP (ref 80–100)
MONOCYTES # BLD AUTO: 0.5 K/UL — SIGNIFICANT CHANGE UP (ref 0–0.9)
MONOCYTES NFR BLD AUTO: 4.8 % — SIGNIFICANT CHANGE UP (ref 2–14)
NEUTROPHILS # BLD AUTO: 9.4 K/UL — HIGH (ref 1.8–7.4)
NEUTROPHILS NFR BLD AUTO: 87.8 % — HIGH (ref 43–77)
NITRITE UR-MCNC: NEGATIVE — SIGNIFICANT CHANGE UP
PH UR: 6 — SIGNIFICANT CHANGE UP (ref 5–8)
PHOSPHATE SERPL-MCNC: 2.3 MG/DL — LOW (ref 2.5–4.5)
PHOSPHATE SERPL-MCNC: 2.7 MG/DL — SIGNIFICANT CHANGE UP (ref 2.5–4.5)
PHOSPHATE SERPL-MCNC: 3.9 MG/DL — SIGNIFICANT CHANGE UP (ref 2.5–4.5)
PLATELET # BLD AUTO: 155 K/UL — SIGNIFICANT CHANGE UP (ref 150–400)
PLATELET # BLD AUTO: 161 K/UL — SIGNIFICANT CHANGE UP (ref 150–400)
POTASSIUM SERPL-MCNC: 3.6 MMOL/L — SIGNIFICANT CHANGE UP (ref 3.5–5.3)
POTASSIUM SERPL-MCNC: 3.8 MMOL/L — SIGNIFICANT CHANGE UP (ref 3.5–5.3)
POTASSIUM SERPL-MCNC: 3.9 MMOL/L — SIGNIFICANT CHANGE UP (ref 3.5–5.3)
POTASSIUM SERPL-MCNC: 4.2 MMOL/L — SIGNIFICANT CHANGE UP (ref 3.5–5.3)
POTASSIUM SERPL-SCNC: 3.6 MMOL/L — SIGNIFICANT CHANGE UP (ref 3.5–5.3)
POTASSIUM SERPL-SCNC: 3.8 MMOL/L — SIGNIFICANT CHANGE UP (ref 3.5–5.3)
POTASSIUM SERPL-SCNC: 3.9 MMOL/L — SIGNIFICANT CHANGE UP (ref 3.5–5.3)
POTASSIUM SERPL-SCNC: 4.2 MMOL/L — SIGNIFICANT CHANGE UP (ref 3.5–5.3)
PROT UR-MCNC: ABNORMAL
PROTHROM AB SERPL-ACNC: 13.1 SEC — HIGH (ref 10–12.9)
RBC # BLD: 3.23 M/UL — LOW (ref 3.8–5.2)
RBC # BLD: 3.94 M/UL — SIGNIFICANT CHANGE UP (ref 3.8–5.2)
RBC # FLD: 13 % — SIGNIFICANT CHANGE UP (ref 10.3–14.5)
RBC # FLD: 13.5 % — SIGNIFICANT CHANGE UP (ref 10.3–14.5)
SODIUM SERPL-SCNC: 140 MMOL/L — SIGNIFICANT CHANGE UP (ref 135–145)
SODIUM SERPL-SCNC: 144 MMOL/L — SIGNIFICANT CHANGE UP (ref 135–145)
SODIUM SERPL-SCNC: 150 MMOL/L — HIGH (ref 135–145)
SODIUM SERPL-SCNC: 155 MMOL/L — HIGH (ref 135–145)
SP GR SPEC: 1.05 — HIGH (ref 1.01–1.02)
T3 SERPL-MCNC: 99 NG/DL — SIGNIFICANT CHANGE UP (ref 80–200)
T4 AB SER-ACNC: 7.8 UG/DL — SIGNIFICANT CHANGE UP (ref 4.6–12)
TOTAL CHOLESTEROL/HDL RATIO MEASUREMENT: 4.5 RATIO — SIGNIFICANT CHANGE UP (ref 3.3–7.1)
TRIGL SERPL-MCNC: 100 MG/DL — SIGNIFICANT CHANGE UP (ref 10–149)
TROPONIN T, HIGH SENSITIVITY RESULT: 46 NG/L — SIGNIFICANT CHANGE UP (ref 0–51)
TROPONIN T, HIGH SENSITIVITY RESULT: 70 NG/L — HIGH (ref 0–51)
TSH SERPL-MCNC: 2.26 UIU/ML — SIGNIFICANT CHANGE UP (ref 0.27–4.2)
UROBILINOGEN FLD QL: NEGATIVE — SIGNIFICANT CHANGE UP
WBC # BLD: 10.7 K/UL — HIGH (ref 3.8–10.5)
WBC # BLD: 18.8 K/UL — HIGH (ref 3.8–10.5)
WBC # FLD AUTO: 10.7 K/UL — HIGH (ref 3.8–10.5)
WBC # FLD AUTO: 18.8 K/UL — HIGH (ref 3.8–10.5)

## 2019-04-03 PROCEDURE — 93306 TTE W/DOPPLER COMPLETE: CPT | Mod: 26

## 2019-04-03 PROCEDURE — 93882 EXTRACRANIAL UNI/LTD STUDY: CPT | Mod: 26

## 2019-04-03 PROCEDURE — 93926 LOWER EXTREMITY STUDY: CPT | Mod: 26,RT

## 2019-04-03 PROCEDURE — 93970 EXTREMITY STUDY: CPT | Mod: 26

## 2019-04-03 PROCEDURE — 70450 CT HEAD/BRAIN W/O DYE: CPT | Mod: 26

## 2019-04-03 PROCEDURE — 43752 NASAL/OROGASTRIC W/TUBE PLMT: CPT

## 2019-04-03 PROCEDURE — 93010 ELECTROCARDIOGRAM REPORT: CPT

## 2019-04-03 PROCEDURE — 99292 CRITICAL CARE ADDL 30 MIN: CPT | Mod: 25

## 2019-04-03 PROCEDURE — 71045 X-RAY EXAM CHEST 1 VIEW: CPT | Mod: 26

## 2019-04-03 PROCEDURE — 99291 CRITICAL CARE FIRST HOUR: CPT

## 2019-04-03 RX ORDER — ACETAMINOPHEN 500 MG
1000 TABLET ORAL ONCE
Qty: 0 | Refills: 0 | Status: COMPLETED | OUTPATIENT
Start: 2019-04-03 | End: 2019-04-03

## 2019-04-03 RX ORDER — POTASSIUM CHLORIDE 20 MEQ
20 PACKET (EA) ORAL
Qty: 0 | Refills: 0 | Status: COMPLETED | OUTPATIENT
Start: 2019-04-03 | End: 2019-04-03

## 2019-04-03 RX ORDER — MAGNESIUM SULFATE 500 MG/ML
1 VIAL (ML) INJECTION ONCE
Qty: 0 | Refills: 0 | Status: COMPLETED | OUTPATIENT
Start: 2019-04-03 | End: 2019-04-03

## 2019-04-03 RX ORDER — NICARDIPINE HYDROCHLORIDE 30 MG/1
2.5 CAPSULE, EXTENDED RELEASE ORAL
Qty: 40 | Refills: 0 | Status: DISCONTINUED | OUTPATIENT
Start: 2019-04-03 | End: 2019-04-03

## 2019-04-03 RX ORDER — MAGNESIUM SULFATE 500 MG/ML
2 VIAL (ML) INJECTION ONCE
Qty: 0 | Refills: 0 | Status: COMPLETED | OUTPATIENT
Start: 2019-04-03 | End: 2019-04-03

## 2019-04-03 RX ORDER — SIMVASTATIN 20 MG/1
1 TABLET, FILM COATED ORAL
Qty: 0 | Refills: 0 | COMMUNITY

## 2019-04-03 RX ORDER — INSULIN LISPRO 100/ML
VIAL (ML) SUBCUTANEOUS EVERY 6 HOURS
Qty: 0 | Refills: 0 | Status: DISCONTINUED | OUTPATIENT
Start: 2019-04-03 | End: 2019-04-07

## 2019-04-03 RX ORDER — ALISKIREN HEMIFUMARATE 300 MG/1
1 TABLET, FILM COATED ORAL
Qty: 0 | Refills: 0 | COMMUNITY

## 2019-04-03 RX ORDER — AMIODARONE HYDROCHLORIDE 400 MG/1
150 TABLET ORAL ONCE
Qty: 0 | Refills: 0 | Status: COMPLETED | OUTPATIENT
Start: 2019-04-03 | End: 2019-04-03

## 2019-04-03 RX ORDER — CHLORHEXIDINE GLUCONATE 213 G/1000ML
15 SOLUTION TOPICAL
Qty: 0 | Refills: 0 | Status: DISCONTINUED | OUTPATIENT
Start: 2019-04-03 | End: 2019-04-03

## 2019-04-03 RX ORDER — AMIODARONE HYDROCHLORIDE 400 MG/1
1 TABLET ORAL
Qty: 900 | Refills: 0 | Status: DISCONTINUED | OUTPATIENT
Start: 2019-04-03 | End: 2019-04-03

## 2019-04-03 RX ORDER — AMIODARONE HYDROCHLORIDE 400 MG/1
0.5 TABLET ORAL
Qty: 900 | Refills: 0 | Status: DISCONTINUED | OUTPATIENT
Start: 2019-04-03 | End: 2019-04-04

## 2019-04-03 RX ORDER — SENNA PLUS 8.6 MG/1
2 TABLET ORAL AT BEDTIME
Qty: 0 | Refills: 0 | Status: DISCONTINUED | OUTPATIENT
Start: 2019-04-03 | End: 2019-04-07

## 2019-04-03 RX ORDER — POTASSIUM CHLORIDE 20 MEQ
40 PACKET (EA) ORAL ONCE
Qty: 0 | Refills: 0 | Status: COMPLETED | OUTPATIENT
Start: 2019-04-03 | End: 2019-04-03

## 2019-04-03 RX ORDER — AMIODARONE HYDROCHLORIDE 400 MG/1
150 TABLET ORAL ONCE
Qty: 0 | Refills: 0 | Status: DISCONTINUED | OUTPATIENT
Start: 2019-04-03 | End: 2019-04-03

## 2019-04-03 RX ORDER — SODIUM CHLORIDE 9 MG/ML
1000 INJECTION INTRAMUSCULAR; INTRAVENOUS; SUBCUTANEOUS
Qty: 0 | Refills: 0 | Status: DISCONTINUED | OUTPATIENT
Start: 2019-04-03 | End: 2019-04-04

## 2019-04-03 RX ORDER — PANTOPRAZOLE SODIUM 20 MG/1
40 TABLET, DELAYED RELEASE ORAL DAILY
Qty: 0 | Refills: 0 | Status: DISCONTINUED | OUTPATIENT
Start: 2019-04-03 | End: 2019-04-04

## 2019-04-03 RX ORDER — HYDRALAZINE HCL 50 MG
10 TABLET ORAL EVERY 6 HOURS
Qty: 0 | Refills: 0 | Status: DISCONTINUED | OUTPATIENT
Start: 2019-04-03 | End: 2019-04-07

## 2019-04-03 RX ORDER — CHLORHEXIDINE GLUCONATE 213 G/1000ML
1 SOLUTION TOPICAL
Qty: 0 | Refills: 0 | Status: DISCONTINUED | OUTPATIENT
Start: 2019-04-03 | End: 2019-04-07

## 2019-04-03 RX ORDER — MAGNESIUM SULFATE 500 MG/ML
1 VIAL (ML) INJECTION ONCE
Qty: 0 | Refills: 0 | Status: DISCONTINUED | OUTPATIENT
Start: 2019-04-03 | End: 2019-04-03

## 2019-04-03 RX ORDER — LABETALOL HCL 100 MG
2 TABLET ORAL
Qty: 0 | Refills: 0 | COMMUNITY

## 2019-04-03 RX ORDER — CALCIUM GLUCONATE 100 MG/ML
1 VIAL (ML) INTRAVENOUS ONCE
Qty: 0 | Refills: 0 | Status: COMPLETED | OUTPATIENT
Start: 2019-04-03 | End: 2019-04-03

## 2019-04-03 RX ORDER — MANNITOL
60 POWDER (GRAM) MISCELLANEOUS ONCE
Qty: 0 | Refills: 0 | Status: COMPLETED | OUTPATIENT
Start: 2019-04-03 | End: 2019-04-03

## 2019-04-03 RX ORDER — ATORVASTATIN CALCIUM 80 MG/1
40 TABLET, FILM COATED ORAL AT BEDTIME
Qty: 0 | Refills: 0 | Status: DISCONTINUED | OUTPATIENT
Start: 2019-04-03 | End: 2019-04-04

## 2019-04-03 RX ORDER — DOCUSATE SODIUM 100 MG
100 CAPSULE ORAL THREE TIMES A DAY
Qty: 0 | Refills: 0 | Status: DISCONTINUED | OUTPATIENT
Start: 2019-04-03 | End: 2019-04-07

## 2019-04-03 RX ORDER — CHLORHEXIDINE GLUCONATE 213 G/1000ML
15 SOLUTION TOPICAL
Qty: 0 | Refills: 0 | Status: DISCONTINUED | OUTPATIENT
Start: 2019-04-03 | End: 2019-04-07

## 2019-04-03 RX ADMIN — Medication 1200 GRAM(S): at 07:10

## 2019-04-03 RX ADMIN — AMIODARONE HYDROCHLORIDE 150 MILLIGRAM(S): 400 TABLET ORAL at 03:00

## 2019-04-03 RX ADMIN — CHLORHEXIDINE GLUCONATE 15 MILLILITER(S): 213 SOLUTION TOPICAL at 17:57

## 2019-04-03 RX ADMIN — PANTOPRAZOLE SODIUM 40 MILLIGRAM(S): 20 TABLET, DELAYED RELEASE ORAL at 12:13

## 2019-04-03 RX ADMIN — Medication 50 MILLIEQUIVALENT(S): at 05:06

## 2019-04-03 RX ADMIN — Medication 400 MILLIGRAM(S): at 02:30

## 2019-04-03 RX ADMIN — Medication 10 MILLIGRAM(S): at 14:18

## 2019-04-03 RX ADMIN — CHLORHEXIDINE GLUCONATE 1 APPLICATION(S): 213 SOLUTION TOPICAL at 23:17

## 2019-04-03 RX ADMIN — Medication 50 MILLIEQUIVALENT(S): at 03:12

## 2019-04-03 RX ADMIN — AMIODARONE HYDROCHLORIDE 150 MILLIGRAM(S): 400 TABLET ORAL at 03:25

## 2019-04-03 RX ADMIN — Medication 1000 MILLIGRAM(S): at 02:45

## 2019-04-03 RX ADMIN — Medication 200 GRAM(S): at 03:58

## 2019-04-03 RX ADMIN — Medication 2: at 18:08

## 2019-04-03 RX ADMIN — AMIODARONE HYDROCHLORIDE 33.33 MG/MIN: 400 TABLET ORAL at 03:56

## 2019-04-03 RX ADMIN — Medication 40 MILLIEQUIVALENT(S): at 16:36

## 2019-04-03 RX ADMIN — Medication 100 GRAM(S): at 16:35

## 2019-04-03 RX ADMIN — Medication 1000 MILLIGRAM(S): at 21:00

## 2019-04-03 RX ADMIN — Medication 10 MILLIGRAM(S): at 06:05

## 2019-04-03 RX ADMIN — ATORVASTATIN CALCIUM 40 MILLIGRAM(S): 80 TABLET, FILM COATED ORAL at 23:16

## 2019-04-03 RX ADMIN — SODIUM CHLORIDE 75 MILLILITER(S): 9 INJECTION INTRAMUSCULAR; INTRAVENOUS; SUBCUTANEOUS at 19:00

## 2019-04-03 RX ADMIN — Medication 400 MILLIGRAM(S): at 20:03

## 2019-04-03 RX ADMIN — SODIUM CHLORIDE 50 MILLILITER(S): 5 INJECTION, SOLUTION INTRAVENOUS at 00:12

## 2019-04-03 RX ADMIN — SENNA PLUS 2 TABLET(S): 8.6 TABLET ORAL at 23:16

## 2019-04-03 RX ADMIN — SODIUM CHLORIDE 50 MILLILITER(S): 5 INJECTION, SOLUTION INTRAVENOUS at 19:00

## 2019-04-03 RX ADMIN — Medication 50 GRAM(S): at 03:10

## 2019-04-03 RX ADMIN — Medication 2: at 05:47

## 2019-04-03 RX ADMIN — Medication 100 MILLIGRAM(S): at 23:16

## 2019-04-03 RX ADMIN — Medication 100 MILLIGRAM(S): at 14:19

## 2019-04-03 NOTE — DIETITIAN INITIAL EVALUATION ADULT. - PERTINENT LABORATORY DATA
(4/3): Hct 33.4, HDL 40, , Lactate 2.4, Cl 110, Glu 186, GFR 43, POCT blood glucose 152, 138, A1c 5.8; Blood Gas: Cl 116, Glu 131, K 3.2, Hgb 8.5, Hct 26
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
## 2019-04-03 NOTE — PROVIDER CONTACT NOTE (OTHER) - ASSESSMENT
R pupil 4 fixed and L reactive, R upper moves spontaneously, right lower follows, Left upper abnormal flexion, Left lower triple flexion.

## 2019-04-03 NOTE — CONSULT NOTE ADULT - SUBJECTIVE AND OBJECTIVE BOX
CHIEF COMPLAINT:Patient is a 86y old  Female who presents with a chief complaint of Stroke, left sided weakness (02 Apr 2019 21:50)      HISTORY OF PRESENT ILLNESS:    86 female with history as below admitted with R IC/MCA occlusion, outside time window for tPA, taken to angio, unsuccessful recanalization. Intraop, unable to access through R groin due to tortuous vessels, carotid artery accessed, c/b neck hematoma, which stabilized with manual compression  ROS limited as pt on vent  early today with episodes of VT    PAST MEDICAL & SURGICAL HISTORY:  HLD (hyperlipidemia)  HTN (hypertension)  Hyperlipidemia  Hypertension  No significant past surgical history          MEDICATIONS:  amiodarone Infusion 1 mG/Min IV Continuous <Continuous>  amiodarone Infusion 0.5 mG/Min IV Continuous <Continuous>  hydrALAZINE Injectable 10 milliGRAM(s) IV Push every 6 hours PRN          docusate sodium Liquid 100 milliGRAM(s) Oral three times a day  pantoprazole  Injectable 40 milliGRAM(s) IV Push daily  senna 2 Tablet(s) Oral at bedtime    atorvastatin 40 milliGRAM(s) Oral at bedtime  insulin lispro (HumaLOG) corrective regimen sliding scale   SubCutaneous every 6 hours    chlorhexidine 0.12% Liquid 15 milliLiter(s) Oral Mucosa two times a day  chlorhexidine 4% Liquid 1 Application(s) Topical <User Schedule>  magnesium sulfate  IVPB 1 Gram(s) IV Intermittent once  potassium chloride   Solution 40 milliEquivalent(s) Oral once  sodium chloride 3%. 500 milliLiter(s) IV Continuous <Continuous>      FAMILY HISTORY:  No pertinent family history in first degree relatives      Non-contributory    SOCIAL HISTORY:    No tobacco, drugs or etoh    Allergies    No Known Allergies    Intolerances    	    REVIEW OF SYSTEMS:  as above  The rest of the 14 points ROS reviewed and except above they are unremarkable.        PHYSICAL EXAM:  T(C): 37.8 (04-03-19 @ 15:00), Max: 37.8 (04-03-19 @ 15:00)  HR: 67 (04-03-19 @ 15:00) (48 - 94)  BP: 213/92 (04-02-19 @ 18:26) (191/104 - 215/88)  RR: 16 (04-03-19 @ 15:00) (10 - 29)  SpO2: 100% (04-03-19 @ 15:00) (88% - 100%)  Wt(kg): --  I&O's Summary    02 Apr 2019 07:01  -  03 Apr 2019 07:00  --------------------------------------------------------  IN: 608.2 mL / OUT: 1665 mL / NET: -1056.8 mL    03 Apr 2019 07:01  -  03 Apr 2019 15:08  --------------------------------------------------------  IN: 433.4 mL / OUT: 735 mL / NET: -301.6 mL        Appearance: on vent 	  Cardiovascular: Normal S1 S2,    Murmur:   Neck: JVP limited to be evaluated   Respiratory: Lungs few rhonchi   Gastrointestinal:  Soft  Skin: normal   Neuro: limited as pt on vent     LABS/DATA:    TELEMETRY: 	    ECG:  	   	  CARDIAC MARKERS:  Troponin I, Serum: .362 ng/mL (04-02 @ 17:00)                        46 <<== 04-03-19 @ 03:21                              11.6   10.7  )-----------( 161      ( 03 Apr 2019 01:15 )             33.4     04-03    150<H>  |  116<H>  |  12  ----------------------------<  154<H>  3.8   |  22  |  1.35<H>    Ca    8.9      03 Apr 2019 12:41  Phos  2.3     04-03  Mg     2.2     04-03    TPro  6.7  /  Alb  4.2  /  TBili  0.7  /  DBili  x   /  AST  17  /  ALT  12  /  AlkPhos  98  04-02    proBNP:   Lipid Profile:   HgA1c: Hemoglobin A1C, Whole Blood: 5.8 % (04-03 @ 05:25)    TSH: Thyroid Stimulating Hormone, Serum: 2.26 uIU/mL (04-03 @ 05:24)

## 2019-04-03 NOTE — DIETITIAN INITIAL EVALUATION ADULT. - PHYSICAL APPEARANCE
Pt intubated/sedated, covered with multiple blankets/sheets at time of RD visit. Unable to provide consent. Pt noted with possible mild body fat depletion to orbitals, however may be age related. Will continue to monitor.

## 2019-04-03 NOTE — H&P ADULT - HISTORY OF PRESENT ILLNESS
86y Female PMH HTN HLD tx from [place] w/ L weakness, L facial droop, R gaze palsy, L neglect. LKW 1150. Patient was driving her car when symptoms started, went to  ED where she was found w/ occlusion of right internal carotid and middle cerebral arteries starting at the bulb, NIHSS 17. Patient functional at baseline, able to handle ADLs without issue. Transferred to [facility] for possible endovascular intervention, TPA not given due to outside of window

## 2019-04-03 NOTE — H&P ADULT - NSICDXPASTMEDICALHX_GEN_ALL_CORE_FT
PAST MEDICAL HISTORY:  HLD (hyperlipidemia)     HTN (hypertension)     Hyperlipidemia     Hypertension

## 2019-04-03 NOTE — PROGRESS NOTE ADULT - SUBJECTIVE AND OBJECTIVE BOX
SUMMARY: 85yo woman transferred from [facility] where she was found to have R IC/MCA occlusion, outside time window for tPA, taken to angio, unsuccessful recanalization. Intraop, unable to access through R groin due to tortuous vessels, carotid artery accessed, c/b neck hematoma, which stabilized with manual compression. PMH HTN/HLD, LWK around 11am on 4/2, per  at bedside, patient drove home, where she didn't park her car right and was struggling to open the front door, when he saw her "not right" and with facial droop. BIBA to [facility] ED. NIHSS 18.     HOSPITAL COURSE:  4/3: -  Immediately post angio around 9pm on patient found to have R 5mm NR pupil, L 3mm R, no movements on all four extremities on propofol, which was stopped, became hypotensive, cardene d/c'ed, was on syed, given mannitol 60mg IV, unable to get IV access, emergent L fem TLC placed, taken to CTH, which showed small R contrast extravasation vs. heme but otherwise no significant edema/MLS.   - Also, noted to have multiple runs of vtachy, and hypotensive, cardene held, amio 150mg push given.    OVERNIGHT EVENTS: R pupil stopped reacting again. Mannitol ordered.     Vitals/labs/meds/imaging reviewed  intubated  EO apraxia.  R pupil - 3mm, NR. L pupil - 3mm, briskly reactive  PERRL, R gaze preference  FC on R side; RUE - AG on command, RLE - Lifts AG on command  L side plegic   Regular rate, iregular, 3/6 blowing systolic murmur RUSB  clear lungs  soft abd/nd  wwp ext; LLE DP pulse 1+, R DP pulse only dopplerable  RLE groin c/d/i, no hematoma  R neck hematoma soft, no expansion SUMMARY: 87yo woman transferred from [facility] where she was found to have R IC/MCA occlusion, outside time window for tPA, taken to angio, unsuccessful recanalization. Intraop, unable to access through R groin due to tortuous vessels, carotid artery accessed, c/b neck hematoma, which stabilized with manual compression. PMH HTN/HLD, LWK around 11am on 4/2, per  at bedside, patient drove home, where she didn't park her car right and was struggling to open the front door, when he saw her "not right" and with facial droop. BIBA to [facility] ED.     ON ADMISSION: NIHSS 18.     HOSPITAL COURSE:  4/3: -  Immediately post angio around 9pm on patient found to have R 5mm NR pupil, L 3mm R, no movements on all four extremities on propofol, which was stopped, became hypotensive, cardene d/c'ed, was on syed, given mannitol 60mg IV, unable to get IV access, emergent L fem TLC placed, taken to CTH, which showed small R contrast extravasation vs. heme but otherwise no significant edema/MLS.   - Also, noted to have multiple runs of vtachy, and hypotensive, cardene held, amio 150mg push given.    OVERNIGHT EVENTS: R pupil stopped reacting again. Mannitol ordered.     Vitals/labs/meds/imaging reviewed  intubated  Eye opening apraxia.  R pupil - 3mm, NR. L pupil - 2mm, briskly reactive  PERRL, R gaze preference  FC on R side; RUE - AG on command, RLE - Lifts AG on command  L side plegic   Regular rate, iregular, 3/6 blowing systolic murmur RUSB  clear lungs  soft abd/nd  wwp ext; LLE DP pulse 1+, R DP pulse only dopplerable  RLE groin c/d/i, no hematoma  R neck hematoma soft, no expansion

## 2019-04-03 NOTE — DIETITIAN INITIAL EVALUATION ADULT. - NS AS NUTRI INTERV MEALS SNACK
General/healthful diet/1) If PO diet initiated, recommend regular diet with no therapeutic restrictions at this time. Defer consistency/texture to medical team, SLP. 2) Monitor PO intake, assess need for oral supplement if suboptimal PO intake noted. 3) Obtain subjective wt/diet history as able.

## 2019-04-03 NOTE — DIETITIAN INITIAL EVALUATION ADULT. - OTHER INFO
Pt seen for length of stay in NSCU. Pt intubated and sedated at time of RD visit. Unable to obtain subjective diet/wt hx at this time. Per review of H&P, no prior hx of oral supplement/vitamin use noted. Baseline chewing/swallowing unknown. No known food allergies noted. No BM's recorded in-house at this time. On bowel regimen as ordered. Pt with an NGT to low wall suction with 100ml output noted (4/3). Pt currently NPO at this time.

## 2019-04-03 NOTE — H&P ADULT - ASSESSMENT
86y Female PMH HTN HLD tx from [place] w/ L weakness, L facial droop, R gaze palsy, L neglect. NIHSS 18, MRS 0. 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 s/p unsuccessful endovascular intervention    Plan:  MRI brain without contrast  Aspirin for secondary stroke prevention at this time. Atorvastatin 80, titrate the dose according to LDL.   TTE with bubble study and telemetry to look for a cardiac source of embolism.   DVT prophylaxis, Neurochecks  Permissive HTN up to 220/120 mmHg for first 24 hours after the symptom onset followed by gradual normotension.   HbA1C and LDL.   PT/OT/Speech and swallow/safety eval.

## 2019-04-03 NOTE — PROVIDER CONTACT NOTE (OTHER) - BACKGROUND
87 yo F  with R ICA/ MCA occlusion, s/p unsuccessful thrombectomy. Accessed via R carotic, now with neck hematoma. found with R pupil fixed and not moving after IR.

## 2019-04-03 NOTE — CHART NOTE - NSCHARTNOTEFT_GEN_A_CORE
CAPRINI SCORE [CLOT] Score on Admission for     AGE RELATED RISK FACTORS                                                       MOBILITY RELATED FACTORS  [ ] Age 41-60 years                                            (1 Point)                  [ ] Bed rest                                                        (1 Point)  [ ] Age: 61-74 years                                           (2 Points)                [ ] Plaster cast                                                   (2 Points)  [x ] Age > 75 years                                              (3 Points)                  [ ] Bed bound for more than 72 hours         (2 Points)    DISEASE RELATED RISK FACTORS                                               GENDER SPECIFIC FACTORS  [ ] Edema in the lower extremities                       (1 Point)           [ ] Pregnancy                                                          (1 Point)  [ ] Varicose veins                                               (1 Point)                  [ ] Post-partum < 6 weeks                                   (1 Point)             [ ] BMI > 25 Kg/m2                                            (1 Point)                  [ ] Hormonal therapy  or oral contraception   (1 Point)                 [ ] Sepsis (in the previous month)                        (1 Point)            [ ] History of pregnancy complications             (1 point)  [ ] Pneumonia or serious lung disease                                            [ ] Unexplained or recurrent               (1 Point)           (in the previous month)                               (1 Point)  [ ] Abnormal pulmonary function test                     (1 Point)                 SURGERY RELATED RISK FACTORS (include planned surgeries)  [ ] Acute myocardial infarction                              (1 Point)                 [ ]  Section                                             (1 Point)  [ ] Congestive heart failure (in the previous month)  (1 Point)        [ ] Minor surgery                                                  (1 Point)   [ ] Inflammatory bowel disease                             (1 Point)                 [ ] Arthroscopic surgery                                        (2 Points)  [ ] Central venous access                                      (2 Points)  [ ] History / presence of malignancy                   (2 points)   [ ] General surgery lasting more than 45 minutes   (2 Points)       [x ] Stroke (in the previous month)                          (5 Points)               [ ] Elective arthroplasty                                         (5 Points)                                                                                                                                               HEMATOLOGY RELATED FACTORS                                                 TRAUMA RELATED RISK FACTORS  [ ] Prior episodes of VTE                                     (3 Points)                [ ] Fracture of the hip, pelvis, or leg                       (5 Points)  [ ] Positive family history for VTE                         (3 Points)             [ ] Acute spinal cord injury (in the previous month)  (5 Points)  [ ] Prothrombin 69072 A                                     (3 Points)                [ ] Paralysis  (less than 1 month)                             (5 Points)  [ ] Factor V Leiden                                             (3 Points)                   [ ] Multiple Trauma within 1 month                        (5 Points)  [ ] Lupus anticoagulants                                     (3 Points)                                                           [ ] Anticardiolipin antibodies                               (3 Points)                                                       [ ] High homocysteine in the blood                      (3 Points)                                             [ ] Other congenital or acquired thrombophilia      (3 Points)                                                [ ] Heparin induced thrombocytopenia                  (3 Points)                                          Total Score [      8    ]    Risk:  Very low 0   Low 1 to 2   Moderate 3 to 4   High =5       VTE Prophylaxis Recommendations:  [x ] mechanical pneumatic compression devices                                      [ ] contraindicated: _____________________  [ ] chemoprophylaxis                                                                                    [ x] contraindicated ____bleeding risk_________________    **** HIGH LIKELIHOOD DVT PRESENT ON ADMISSION  [x ] (please order LE dopplers within 24 hours of admission)

## 2019-04-03 NOTE — DIETITIAN INITIAL EVALUATION ADULT. - ENERGY NEEDS
Pt is an 87 yo F with PMH: Hyperlipidemia, HTN. Presented Pt is an 87 yo F with PMH: Hyperlipidemia, HTN. Presented as transfer from [facility] with slurred speech, left sided hemiparesis. Found to have R IC/MCA occlusion, outside time window for tPA. s/p selective cerebral angiography and mechanical thrombectomy, where unsuccessful recanalization noted. Unable to access through R groin due to tortuous vessels, carotid artery accessed c/b neck hematoma. Pt remains intubated at this time.     Ht: 60"  Wt: 121 lbs  BMI:  23.6 kg/m2   IBW: 100 (+/-10%)     121 % IBW  Edema: No edema noted          Skin: Surgical incision to R groin safeguard s/p angio, R neck hematoma

## 2019-04-03 NOTE — CHART NOTE - NSCHARTNOTEFT_GEN_A_CORE
indication: PO access, cerebral infarction  Patient was seen and examined at the bedside. [place] sump tube placement procedure was explained to the patient. Head of the bed was elevated to 90 degrees. Placement of tube was attempted in the right nostril. [place] sump was placed successfully on 1 attempt. CXR was done to confirm placement. No complications, patient tolerated procedure well.

## 2019-04-03 NOTE — PROGRESS NOTE ADULT - ASSESSMENT
R IC/MCA occlusion on CTA, no tPA, unsuccessful revascularization; NIHSS 18  Possibly cardioemabolic  ?intracranial atherosclerosis    Neuro:  Repeat CT scan this AM  stroke core measures: A1C, LDL  On Atorvastatin 40 qHS  MRI pending  On hypertonics to help w/ cerebral edema  monitor neck hematoma  bedrest    Card:  Tachycarrhythmia/ Episodes of V tach - On amio gtt   likely mod-sev AS - TTE  -160  hydralazine PRN    Pulm:  intubated  Peridex  CPAP for exercise    GI:  NPO  Bowel regimen  Gi ppx: protonix 40 iv daily    Renal:  3% for Na 145-155  BMP q6H    Endo:  maintain euglycemia  A1C 5.8  Lipid panel    Heme/onc:  SCDs, hold chemoppx fresh till repeat imaging    ID: Hypothermic overnight  Will panculture.     DNR  ICU    at risk for deterioration and death due to acute stroke, cytotoxic edema, ventricular arrhythmia, cardiac arrest  critical care time 90min R IC/MCA occlusion on CTA, no tPA, unsuccessful revascularization; NIHSS 18  Possibly cardioembolic  ?intracranial atherosclerosis    Neuro:  Repeat CT scan this AM  stroke core measures: A1C, LDL  On Atorvastatin 40 qHS  MRI pending  On hypertonics to help w/ cerebral edema  monitor neck hematoma  Hemicraniectomy watch  bedrest    Card:  Tachycarrhythmia/ Episodes of V tach - On amio gtt   likely mod-sev AS - TTE  -160  hydralazine PRN    Pulm:  intubated  Peridex  CPAP for exercise    GI:  NPO  Bowel regimen  Gi ppx: protonix 40 iv daily    Renal:  3% for Na 145-155  BMP q6H    Endo:  maintain euglycemia  A1C 5.8  Lipid panel    Heme/onc:  SCDs, hold chemoppx fresh till repeat imaging    ID: Hypothermic overnight  Will panculture.     DNR  ICU    at risk for deterioration and death due to acute stroke, cytotoxic edema, ventricular arrhythmia, cardiac arrest  critical care time 35min R IC/MCA occlusion on CTA, no tPA, unsuccessful revascularization; NIHSS 18  Possibly cardioembolic  ?intracranial atherosclerosis    Neuro:  Repeat CT scan this AM  stroke core measures: A1C, LDL  On Atorvastatin 40 qHS  MRI pending  On hypertonics to help w/ cerebral edema  monitor neck hematoma  Hemicraniectomy watch; called NSGY (Dr. Gonzales) who declined consult; requested we call back if patient worsens  bedrest    Card:  Tachycarrhythmia/ Episodes of V tach - On amio gtt   likely mod-sev AS - TTE  -160  hydralazine PRN    Pulm:  intubated  Peridex  CPAP for exercise    GI:  NPO  Bowel regimen  Gi ppx: protonix 40 iv daily    Renal:  3% for Na 145-155  BMP q6H    Endo:  maintain euglycemia  A1C 5.8  Lipid panel    Heme/onc:  SCDs, hold chemoppx fresh till repeat imaging    ID: Hypothermic overnight  Will panculture.     DNR  ICU    at risk for deterioration and death due to acute stroke, cytotoxic edema, ventricular arrhythmia, cardiac arrest  critical care time 35min

## 2019-04-03 NOTE — H&P ADULT - NSHPPHYSICALEXAM_GEN_ALL_CORE
Neurological Examination:    Mental Status: Patient is somnolent, awake, oriented X3. Patient is fluent but severely dysarthric, no motor/sensory aphasia     Cranial Nerves: PERRL, EOMI, visual field intact, V1-V3 intact, no gross facial asymmetry, tongue/uvula midline    Motor Exam: No drift  Right upper extremity: 5/5  Left upper extremity: 0/5  Right lower extremity: 5/5  Left lower extremity: 0/5    Normal bulk/tone    Sensory: Decreased to LT on L w/ L hemineglect, not recognizing her own hand    Coordination: Finger to nose intact on R, unable to participate on left    Reflexes: Bilateral 2+ Biceps, Brachial, Patellar, Ankle  Plantar: Down bilateral    GENERAL: No acute distress  HEENT:  Normocephalic, atraumatic  EXTREMITIES: No edema, clubbing, cyanosis  MUSCULOSKELETAL: Normal range of motion  SKIN: No rashes

## 2019-04-03 NOTE — DIETITIAN INITIAL EVALUATION ADULT. - NS AS NUTRI INTERV ENTERAL NUTRITION
Composition/4) If EN preferred route, recommend Jevity 1.5 at 60ml/hr x 18 hrs. To provide: (at goal): 1620kcal (29kcal/kg 55kg) and 69g protein (1.3g protein/kg 55kg). Monitor GI tolerance, BM's. RD to adjust EN formulary, volume/rate PRN. Monitor nutrition related labs, skin integrity and hydration status. 5) Recommend Castro Active twice daily.

## 2019-04-04 LAB
ANION GAP SERPL CALC-SCNC: 10 MMOL/L — SIGNIFICANT CHANGE UP (ref 5–17)
ANION GAP SERPL CALC-SCNC: 12 MMOL/L — SIGNIFICANT CHANGE UP (ref 5–17)
ANION GAP SERPL CALC-SCNC: 12 MMOL/L — SIGNIFICANT CHANGE UP (ref 5–17)
BUN SERPL-MCNC: 14 MG/DL — SIGNIFICANT CHANGE UP (ref 7–23)
BUN SERPL-MCNC: 15 MG/DL — SIGNIFICANT CHANGE UP (ref 7–23)
BUN SERPL-MCNC: 16 MG/DL — SIGNIFICANT CHANGE UP (ref 7–23)
CALCIUM SERPL-MCNC: 8 MG/DL — LOW (ref 8.4–10.5)
CALCIUM SERPL-MCNC: 8.8 MG/DL — SIGNIFICANT CHANGE UP (ref 8.4–10.5)
CALCIUM SERPL-MCNC: 9.2 MG/DL — SIGNIFICANT CHANGE UP (ref 8.4–10.5)
CHLORIDE SERPL-SCNC: 122 MMOL/L — HIGH (ref 96–108)
CHLORIDE SERPL-SCNC: 125 MMOL/L — HIGH (ref 96–108)
CHLORIDE SERPL-SCNC: 126 MMOL/L — HIGH (ref 96–108)
CO2 SERPL-SCNC: 20 MMOL/L — LOW (ref 22–31)
CO2 SERPL-SCNC: 20 MMOL/L — LOW (ref 22–31)
CO2 SERPL-SCNC: 21 MMOL/L — LOW (ref 22–31)
CREAT SERPL-MCNC: 1.43 MG/DL — HIGH (ref 0.5–1.3)
CREAT SERPL-MCNC: 1.46 MG/DL — HIGH (ref 0.5–1.3)
CREAT SERPL-MCNC: 1.7 MG/DL — HIGH (ref 0.5–1.3)
GAS PNL BLDA: SIGNIFICANT CHANGE UP
GLUCOSE SERPL-MCNC: 121 MG/DL — HIGH (ref 70–99)
GLUCOSE SERPL-MCNC: 128 MG/DL — HIGH (ref 70–99)
GLUCOSE SERPL-MCNC: 174 MG/DL — HIGH (ref 70–99)
HCT VFR BLD CALC: 27.5 % — LOW (ref 34.5–45)
HCT VFR BLD CALC: 30 % — LOW (ref 34.5–45)
HGB BLD-MCNC: 9 G/DL — LOW (ref 11.5–15.5)
HGB BLD-MCNC: 9.2 G/DL — LOW (ref 11.5–15.5)
MAGNESIUM SERPL-MCNC: 2.4 MG/DL — SIGNIFICANT CHANGE UP (ref 1.6–2.6)
MCHC RBC-ENTMCNC: 27 PG — SIGNIFICANT CHANGE UP (ref 27–34)
MCHC RBC-ENTMCNC: 29 PG — SIGNIFICANT CHANGE UP (ref 27–34)
MCHC RBC-ENTMCNC: 30.8 GM/DL — LOW (ref 32–36)
MCHC RBC-ENTMCNC: 32.6 GM/DL — SIGNIFICANT CHANGE UP (ref 32–36)
MCV RBC AUTO: 87.8 FL — SIGNIFICANT CHANGE UP (ref 80–100)
MCV RBC AUTO: 89 FL — SIGNIFICANT CHANGE UP (ref 80–100)
OSMOLALITY SERPL: 319 MOS/KG — HIGH (ref 275–300)
PHOSPHATE SERPL-MCNC: 2.6 MG/DL — SIGNIFICANT CHANGE UP (ref 2.5–4.5)
PLATELET # BLD AUTO: 160 K/UL — SIGNIFICANT CHANGE UP (ref 150–400)
PLATELET # BLD AUTO: 166 K/UL — SIGNIFICANT CHANGE UP (ref 150–400)
POTASSIUM SERPL-MCNC: 3.4 MMOL/L — LOW (ref 3.5–5.3)
POTASSIUM SERPL-MCNC: 4.4 MMOL/L — SIGNIFICANT CHANGE UP (ref 3.5–5.3)
POTASSIUM SERPL-MCNC: 4.5 MMOL/L — SIGNIFICANT CHANGE UP (ref 3.5–5.3)
POTASSIUM SERPL-SCNC: 3.4 MMOL/L — LOW (ref 3.5–5.3)
POTASSIUM SERPL-SCNC: 4.4 MMOL/L — SIGNIFICANT CHANGE UP (ref 3.5–5.3)
POTASSIUM SERPL-SCNC: 4.5 MMOL/L — SIGNIFICANT CHANGE UP (ref 3.5–5.3)
RBC # BLD: 3.1 M/UL — LOW (ref 3.8–5.2)
RBC # BLD: 3.42 M/UL — LOW (ref 3.8–5.2)
RBC # FLD: 13.2 % — SIGNIFICANT CHANGE UP (ref 10.3–14.5)
RBC # FLD: 13.3 % — SIGNIFICANT CHANGE UP (ref 10.3–14.5)
SODIUM SERPL-SCNC: 154 MMOL/L — HIGH (ref 135–145)
SODIUM SERPL-SCNC: 156 MMOL/L — HIGH (ref 135–145)
SODIUM SERPL-SCNC: 158 MMOL/L — HIGH (ref 135–145)
WBC # BLD: 19.4 K/UL — HIGH (ref 3.8–10.5)
WBC # BLD: 20.1 K/UL — HIGH (ref 3.8–10.5)
WBC # FLD AUTO: 19.4 K/UL — HIGH (ref 3.8–10.5)
WBC # FLD AUTO: 20.1 K/UL — HIGH (ref 3.8–10.5)

## 2019-04-04 PROCEDURE — 99291 CRITICAL CARE FIRST HOUR: CPT

## 2019-04-04 PROCEDURE — 99292 CRITICAL CARE ADDL 30 MIN: CPT

## 2019-04-04 PROCEDURE — ZZZZZ: CPT

## 2019-04-04 PROCEDURE — 70450 CT HEAD/BRAIN W/O DYE: CPT | Mod: 26

## 2019-04-04 PROCEDURE — 71045 X-RAY EXAM CHEST 1 VIEW: CPT | Mod: 26

## 2019-04-04 RX ORDER — ACETAMINOPHEN 500 MG
1000 TABLET ORAL ONCE
Qty: 0 | Refills: 0 | Status: DISCONTINUED | OUTPATIENT
Start: 2019-04-04 | End: 2019-04-07

## 2019-04-04 RX ORDER — PANTOPRAZOLE SODIUM 20 MG/1
40 TABLET, DELAYED RELEASE ORAL
Qty: 0 | Refills: 0 | Status: DISCONTINUED | OUTPATIENT
Start: 2019-04-04 | End: 2019-04-07

## 2019-04-04 RX ORDER — SODIUM CHLORIDE 9 MG/ML
1000 INJECTION, SOLUTION INTRAVENOUS
Qty: 0 | Refills: 0 | Status: DISCONTINUED | OUTPATIENT
Start: 2019-04-04 | End: 2019-04-06

## 2019-04-04 RX ORDER — ACETAMINOPHEN 500 MG
650 TABLET ORAL EVERY 6 HOURS
Qty: 0 | Refills: 0 | Status: DISCONTINUED | OUTPATIENT
Start: 2019-04-04 | End: 2019-04-07

## 2019-04-04 RX ORDER — ASPIRIN/CALCIUM CARB/MAGNESIUM 324 MG
81 TABLET ORAL DAILY
Qty: 0 | Refills: 0 | Status: DISCONTINUED | OUTPATIENT
Start: 2019-04-04 | End: 2019-04-07

## 2019-04-04 RX ORDER — POTASSIUM CHLORIDE 20 MEQ
40 PACKET (EA) ORAL ONCE
Qty: 0 | Refills: 0 | Status: COMPLETED | OUTPATIENT
Start: 2019-04-04 | End: 2019-04-04

## 2019-04-04 RX ORDER — ATORVASTATIN CALCIUM 80 MG/1
80 TABLET, FILM COATED ORAL AT BEDTIME
Qty: 0 | Refills: 0 | Status: DISCONTINUED | OUTPATIENT
Start: 2019-04-04 | End: 2019-04-07

## 2019-04-04 RX ORDER — ENOXAPARIN SODIUM 100 MG/ML
40 INJECTION SUBCUTANEOUS
Qty: 0 | Refills: 0 | Status: DISCONTINUED | OUTPATIENT
Start: 2019-04-04 | End: 2019-04-07

## 2019-04-04 RX ORDER — PANTOPRAZOLE SODIUM 20 MG/1
40 TABLET, DELAYED RELEASE ORAL ONCE
Qty: 0 | Refills: 0 | Status: COMPLETED | OUTPATIENT
Start: 2019-04-04 | End: 2019-04-04

## 2019-04-04 RX ORDER — AMIODARONE HYDROCHLORIDE 400 MG/1
200 TABLET ORAL DAILY
Qty: 0 | Refills: 0 | Status: DISCONTINUED | OUTPATIENT
Start: 2019-04-04 | End: 2019-04-07

## 2019-04-04 RX ADMIN — SODIUM CHLORIDE 70 MILLILITER(S): 9 INJECTION, SOLUTION INTRAVENOUS at 07:00

## 2019-04-04 RX ADMIN — SODIUM CHLORIDE 70 MILLILITER(S): 9 INJECTION, SOLUTION INTRAVENOUS at 17:24

## 2019-04-04 RX ADMIN — PANTOPRAZOLE SODIUM 40 MILLIGRAM(S): 20 TABLET, DELAYED RELEASE ORAL at 01:21

## 2019-04-04 RX ADMIN — PANTOPRAZOLE SODIUM 40 MILLIGRAM(S): 20 TABLET, DELAYED RELEASE ORAL at 17:11

## 2019-04-04 RX ADMIN — CHLORHEXIDINE GLUCONATE 1 APPLICATION(S): 213 SOLUTION TOPICAL at 21:22

## 2019-04-04 RX ADMIN — Medication 100 MILLIGRAM(S): at 21:22

## 2019-04-04 RX ADMIN — CHLORHEXIDINE GLUCONATE 15 MILLILITER(S): 213 SOLUTION TOPICAL at 17:11

## 2019-04-04 RX ADMIN — ATORVASTATIN CALCIUM 80 MILLIGRAM(S): 80 TABLET, FILM COATED ORAL at 21:22

## 2019-04-04 RX ADMIN — ENOXAPARIN SODIUM 40 MILLIGRAM(S): 100 INJECTION SUBCUTANEOUS at 17:11

## 2019-04-04 RX ADMIN — PANTOPRAZOLE SODIUM 40 MILLIGRAM(S): 20 TABLET, DELAYED RELEASE ORAL at 05:14

## 2019-04-04 RX ADMIN — Medication 81 MILLIGRAM(S): at 12:40

## 2019-04-04 RX ADMIN — SENNA PLUS 2 TABLET(S): 8.6 TABLET ORAL at 21:22

## 2019-04-04 RX ADMIN — AMIODARONE HYDROCHLORIDE 200 MILLIGRAM(S): 400 TABLET ORAL at 05:14

## 2019-04-04 RX ADMIN — Medication 100 MILLIGRAM(S): at 05:40

## 2019-04-04 RX ADMIN — Medication 10 MILLIGRAM(S): at 02:45

## 2019-04-04 RX ADMIN — Medication 650 MILLIGRAM(S): at 18:55

## 2019-04-04 RX ADMIN — CHLORHEXIDINE GLUCONATE 15 MILLILITER(S): 213 SOLUTION TOPICAL at 05:14

## 2019-04-04 RX ADMIN — Medication 40 MILLIEQUIVALENT(S): at 17:11

## 2019-04-04 NOTE — PROGRESS NOTE ADULT - ASSESSMENT
VT, Polymorphic  Consistent with Torsades   due to Long QT   EKG in 2017 with normal qt   this is a likely due to right sided stroke with qt prolongation and coronary ischemia   for now may cont amio as since its initiation , no further VT   keep K , Mg normal , replete MG   avoid any other qt prolonging drugs    AS  Severe  avoid over hydration   monitor for acute CHF    cva   likely embolic  eventual ILR  consider MAHNAZ

## 2019-04-04 NOTE — PROVIDER CONTACT NOTE (EICU) - ASSESSMENT
Intubated. Pt does not open eyes spontaneously or to painful stimulus.  Right pupil 4mm, round, fixed. Left pupil 3mm, round, brisk. Bilateral flexion on upper extremities on painful stimulus, bilateral triple flexion on lower extremities on painful stimulus. HR 77, 02 100, 135/54, RR 17. Temp 38.8 via rectal probe.

## 2019-04-04 NOTE — CONSULT NOTE ADULT - SUBJECTIVE AND OBJECTIVE BOX
p (6040)     HPI:  86y Female PMH HTN HLD tx from [place] w/ L weakness, L facial droop, R gaze palsy, L neglect. LKW 1150. Patient was driving her car when symptoms started, went to  ED where she was found w/ occlusion of right internal carotid and middle cerebral arteries starting at the bulb, NIHSS 17. Patient functional at baseline, able to handle ADLs without issue. Transferred to [facility] for possible endovascular intervention, TPA not given due to outside of window (2019 03:34)    PAST MEDICAL HISTORY   HLD (hyperlipidemia)  HTN (hypertension)  Hyperlipidemia  Hypertension    PAST SURGICAL HISTORY   No significant past surgical history        MEDICATIONS:  Antibiotics:    Neuro:  acetaminophen    Suspension .. 650 milliGRAM(s) Oral every 6 hours PRN  acetaminophen  IVPB .. 1000 milliGRAM(s) IV Intermittent once    Anticoagulation:  aspirin  chewable 81 milliGRAM(s) Oral daily  enoxaparin Injectable 40 milliGRAM(s) SubCutaneous <User Schedule>    Other:  amiodarone    Tablet 200 milliGRAM(s) Oral daily  atorvastatin 80 milliGRAM(s) Oral at bedtime  docusate sodium Liquid 100 milliGRAM(s) Oral three times a day  hydrALAZINE Injectable 10 milliGRAM(s) IV Push every 6 hours PRN  insulin lispro (HumaLOG) corrective regimen sliding scale   SubCutaneous every 6 hours  lactated ringers. 1000 milliLiter(s) IV Continuous <Continuous>  pantoprazole  Injectable 40 milliGRAM(s) IV Push two times a day  senna 2 Tablet(s) Oral at bedtime      SOCIAL HISTORY:   Occupation:   Marital Status:     FAMILY HISTORY:  No pertinent family history in first degree relatives      REVIEW OF SYSTEMS:  Check here if all are normal other than Neurological []  General:  Eyes:  ENT:  Cardiac:  Respiratory:  GI:  Musculoskeletal:   Skin:  Neurologic:   Psychiatric:     PHYSICAL EXAMINATION:   T(C): 38.8 (19 @ 19:00), Max: 38.8 (19 @ 19:00)  HR: 78 (19 @ 19:00) (57 - 97)  BP: 135/54 (19 @ 19:00) (135/54 - 135/54)  RR: 16 (19 @ 19:00) (0 - 24)  SpO2: 100% (19 @ 19:00) (98% - 100%)  Wt(kg): --    General Examination:     Neurologic Examination:           Intubated, R pupil 4 and NR, L 2 and reactive, no corneals, +cough/gag  BUE flexor  BLE TF    LABS:                        9.2    20.1  )-----------( 166      ( 2019 03:16 )             30.0     04-04    156<H>  |  126<H>  |  15  ----------------------------<  128<H>  3.4<L>   |  20<L>  |  1.43<H>    Ca    8.0<L>      2019 15:52  Phos  2.7     04-03  Mg     2.7     04-03      PT/INR - ( 2019 01:15 )   PT: 13.1 sec;   INR: 1.13 ratio         PTT - ( 2019 01:15 )  PTT:26.9 sec  Urinalysis Basic - ( 2019 11:02 )    Color: Colorless / Appearance: Clear / S.050 / pH: x  Gluc: x / Ketone: Negative  / Bili: Negative / Urobili: Negative   Blood: x / Protein: Trace / Nitrite: Negative   Leuk Esterase: Negative / RBC: 4 /hpf / WBC 2 /HPF   Sq Epi: x / Non Sq Epi: 0 /hpf / Bacteria: Negative        RADIOLOGY & ADDITIONAL STUDIES:    IMPRESSION:    Redemonstration of a large acute right MCA and JEANETTE territory infarct.   Increased cytotoxic edema and mass effect.     Minimally increased density in the region of the right striate nucleus   may represent spared gray matter or petechial hemorrhagic transformation.   No CT evidence of large hemorrhagic transformation.    New leftward midline shift of 8 mm. Stable ventricular size, no   hydrocephalus.                              ANUM MORA M.D., ATTENDING RADIOLOGIST  This document has been electronically signed. 2019  9:32AM

## 2019-04-04 NOTE — PROGRESS NOTE ADULT - ATTENDING COMMENTS
Spoke with patient's  at bedside and patient's daughter Bev via phone ([phone]).    Relayed the same information to them, namely that Mrs. Townsend suffered a massive right sided stroke and continues to have significant brain swelling despite our maximal medical management. I relayed that brain swelling after stroke occurs for several days after stroke onset and that Mrs. Townsend may develop additional neurological damage from this swelling, including brainstem compression resulting in death. We briefly discussed the quality of life Mrs. Townsend may have wanted and I raised the possibility she may need a tracheostomy, and most likely a percutaneous endoscopic gastrostomy tube should the plan be to continue full aggressive medical management and she survives her brain swelling. We rediscussed code status and confirmed that Mrs. Townsend would not want compressions should her heart stop.     Daughter Bev will be coming tomorrow to discuss further.

## 2019-04-04 NOTE — PROGRESS NOTE ADULT - SUBJECTIVE AND OBJECTIVE BOX
SUMMARY: 85yo woman transferred from [facility] where she was found to have R IC/MCA occlusion, outside time window for tPA, taken to angio, unsuccessful recanalization. Intraop, unable to access through R groin due to tortuous vessels, carotid artery accessed, c/b neck hematoma, which stabilized with manual compression. PMH HTN/HLD, LWK around 11am on 4/2, per  at bedside, patient drove home, where she didn't park her car right and was struggling to open the front door, when he saw her "not right" and with facial droop. BIBA to [facility] ED.     ON ADMISSION: NIHSS 18.     HOSPITAL COURSE:  4/3: -  Immediately post angio around 9pm on patient found to have R 5mm NR pupil, L 3mm R, no movements on all four extremities on propofol, which was stopped, became hypotensive, cardene d/c'ed, was on syed, given mannitol 60mg IV, unable to get IV access, emergent L fem TLC placed, taken to CTH, which showed small R contrast extravasation vs. heme but otherwise no significant edema/MLS.   - Also, noted to have multiple runs of vtachy, and hypotensive, cardene held, amio 150mg push given.    OVERNIGHT EVENTS: Torsades; Cardiology following. Neurogenic QT prolongation, per Cardio    Vitals/labs/meds/imaging reviewed  intubated  Eye opening apraxia.  R pupil - 3mm, NR. L pupil - 2mm, briskly reactive  PERRL, R gaze preference  FC on R side; RUE - AG on command, RLE - Lifts AG on command  L side plegic   Regular rate, iregular, 3/6 blowing systolic murmur RUSB  clear lungs  soft abd/nd  wwp ext; LLE DP pulse 1+, R DP pulse only dopplerable  RLE groin c/d/i, no hematoma  R neck hematoma soft, no expansion SUMMARY: 87yo woman transferred from [facility] where she was found to have R IC/MCA occlusion, outside time window for tPA, taken to angio, unsuccessful recanalization. Intraop, unable to access through R groin due to tortuous vessels, carotid artery accessed, c/b neck hematoma, which stabilized with manual compression. PMH HTN/HLD, LWK around 11am on 4/2, per  at bedside, patient drove home, where she didn't park her car right and was struggling to open the front door, when he saw her "not right" and with facial droop. BIBA to [facility] ED.     ON ADMISSION: NIHSS 18.     HOSPITAL COURSE:  4/3: -  Immediately post angio around 9pm on patient found to have R 5mm NR pupil, L 3mm R, no movements on all four extremities on propofol, which was stopped, became hypotensive, cardene d/c'ed, was on syed, given mannitol 60mg IV, unable to get IV access, emergent L fem TLC placed, taken to CTH, which showed small R contrast extravasation vs. heme but otherwise no significant edema/MLS.   - Also, noted to have multiple runs of vtachy, and hypotensive, cardene held, amio 150mg push given.    OVERNIGHT EVENTS: Torsades; Cardiology following. Neurogenic QT prolongation, per Cardio    Vitals/labs/meds/imaging reviewed  intubated  Eye opening apraxia.  R pupil - 3mm, NR. L pupil - 2mm, briskly reactive  PERRL, R gaze preference  No command following  Purposeful on the right  L side plegic   Irregular rate, 3/6 blowing systolic murmur RUSB  clear lungs  soft abd/nd  wwp ext; LLE DP pulse 1+, R DP pulse only dopplerable  RLE groin c/d/i, no hematoma  R neck hematoma soft, no expansion

## 2019-04-04 NOTE — PROVIDER CONTACT NOTE (EICU) - ACTION/TREATMENT ORDERED:
ABDULKADIR Jerome, and Dr. Musa Cary at bedside to assess patient. Draw BMP, as per ABDULKADIR Jeorme. Will continue to monitor.

## 2019-04-04 NOTE — OCCUPATIONAL THERAPY INITIAL EVALUATION ADULT - DIAGNOSIS, OT EVAL
Pt with significant change in level of alertness, and independence in functional activities. Pt intubated at this time and requires further evaluation when more alert

## 2019-04-04 NOTE — PROGRESS NOTE ADULT - ASSESSMENT
86y Female PMH HTN HLD tx from [place] w/ L weakness, L facial droop, R gaze palsy, L neglect. NIHSS 18, MRS 0. 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 CT brain on admission showed early changes of ischemia in the right MCA distribution predominantly involving deep  territories. CTA head and neck showed occlusion of right MCA M1 segment, supraclinoid ICA  with very sluggish flow in the right proximal ICA probably due to distal intracranial occlusion. CT perfusion showed small core infarct of 4 mL. Pt underwent thrombectomy but unsuccessful revascularization due to significant clot burden. NeuroSx stated no surgical intervention    4/4/19  CTH today shows worsening cytotoxic edema in the large R MCA/JEANETTE territory infarct.  Presumed cardioembolic stroke.   c/w ASA 81  c/w atorvastatin 80  A1c 5.8;     appreciate NSCU care

## 2019-04-04 NOTE — OCCUPATIONAL THERAPY INITIAL EVALUATION ADULT - PERTINENT HX OF CURRENT PROBLEM, REHAB EVAL
86y Female PMH HTN HLD tx from [place] w/ L weakness, L facial droop, R gaze palsy, L neglect. LKW 1150. Patient was driving her car when symptoms started, went to  ED where she was found w/ occlusion of right internal carotid and middle cerebral arteries starting at the bulb

## 2019-04-04 NOTE — PHYSICAL THERAPY INITIAL EVALUATION ADULT - PERTINENT HX OF CURRENT PROBLEM, REHAB EVAL
86y Female PMH HTN HLD tx from [place] w/ L weakness, L facial droop, R gaze palsy, L neglect. LKW 1150. Patient was driving her car when symptoms started, went to  ED where she was found w/ occlusion of right internal carotid and middle cerebral arteries starting at the bulb 86y Female PMH HTN HLD tx from [place] w/ L weakness, L facial droop, R gaze palsy, L neglect. LKW 1150. Patient was driving her car when symptoms started, went to [facility] ED where she was found w/ occlusion of right internal carotid and middle cerebral arteries starting at the bulb. B LE US (4/2): (-). Pt s/p TTE 4/3. CT head (4/3): large evolving acute R MCA infarct.

## 2019-04-04 NOTE — OCCUPATIONAL THERAPY INITIAL EVALUATION ADULT - RANGE OF MOTION EXAMINATION, UPPER EXTREMITY
Pt with active movement of RUE/LE, +synergistic movement of Lshoulder and elbow noted Right UE Active ROM was WFL (within functional limits)/Left UE Passive ROM was WFL  (within functional limits)/Pt with active movement of RUE/LE, +synergistic movement of Lshoulder and elbow noted

## 2019-04-04 NOTE — PROVIDER CONTACT NOTE (EICU) - ASSESSMENT
/91, HR 82, O2 99%, RR 19, 37.4 rectal probe temp. Pt follows command on right side, withdrawal on upper left extremities, triple flexion on lower left extremity. Abdomen soft.

## 2019-04-04 NOTE — OCCUPATIONAL THERAPY INITIAL EVALUATION ADULT - GENERAL OBSERVATIONS, REHAB EVAL
Pt recvd, intubated, ncu monitoring, +BLE venodynes, +PIV LUE. Pt unable to open eyes at this time, +wrist restraints

## 2019-04-04 NOTE — PROGRESS NOTE ADULT - ASSESSMENT
R IC/MCA occlusion on CTA, no tPA, unsuccessful revascularization; NIHSS 18  Possibly cardioembolic  ?intracranial atherosclerosis    Neuro:  Neurocheck q1H  CT this AM - Contrast extravasation resolved  stroke core measures: A1C, LDL  Increase atorvastatin to 80 qHS  MRI pending  On hypertonics to help w/ cerebral edema  monitor neck hematoma  Hemicraniectomy watch; called NSGY (Dr. Gonzales) who declined consult; requested we call back if patient worsens  bedrest    Card:  Torsades - On amio 200 daily (per Cardio)   likely mod-sev AS - TTE  -160  hydralazine PRN    Pulm:  intubated  Peridex  Attempt extubation    GI:  NPO for possible extubation  Bowel regimen  Gi ppx: protonix 40 iv daily    Renal: Na goal 145-155  On LR   BMP q6H    Endo:  maintain euglycemia  A1C 5.8  Lipid panel    Heme/onc:  SCDs, hold chemoppx fresh till repeat imaging    ID: Hypothermic overnight  Will panculture.     DNR  ICU    at risk for deterioration and death due to acute stroke, cytotoxic edema, ventricular arrhythmia, cardiac arrest  critical care time 35min R IC/MCA occlusion on CTA, no tPA, unsuccessful revascularization; NIHSS 18  Possibly cardioembolic  ?intracranial atherosclerosis    Neuro:  Neurocheck q1H  CT this AM - Contrast extravasation resolved  stroke core measures: A1C, LDL  Increase atorvastatin to 80 qHS  Start ASA 81  monitor neck hematoma  NSGY not offering surgical intervention  Palliative consult; GOC discussion  bedrest    Card:  Torsades - On amio 200 daily (per Cardio)  Keep K > 4, Mg > 2, Phos > 3  likely sev AS - TTE  -160  hydralazine PRN    Pulm:  intubated  Peridex  CPAP; GOC discussion    GI:  NPO for extubation; GOC discussion  Bowel regimen  Gi ppx: protonix 40 iv daily    Renal: Na goal 145-155  On LR     Endo:  maintain euglycemia  A1C 5.8    Heme/onc:  SCDs, start Lovenox 40 qHS    ID: Hypothermic 4/3  F/u cultures    DNR  ICU    at risk for deterioration and death due to acute stroke, cytotoxic edema, ventricular arrhythmia, cardiac arrest  critical care time 35min R IC/MCA occlusion on CTA, no tPA, unsuccessful revascularization; NIHSS 18  Possibly cardioembolic  ?intracranial atherosclerosis    Neuro:  Neurocheck q1H  CT this AM - RACA/MCA stroke   stroke core measures: A1C, LDL  Increase atorvastatin to 80 qHS  Start ASA 81  monitor neck hematoma  NSGY not offering surgical intervention  Palliative consult; GOC discussion  bedrest    Card:  Torsades - On amio 200 daily (per Cardio)  Keep K > 4, Mg > 2, Phos > 3  likely sev AS - TTE  -160  hydralazine PRN    Pulm:  intubated  Peridex  CPAP; GOC discussion    GI:  NPO for now  Bowel regimen  Gi ppx: protonix 40 iv daily    Renal: Na goal 145-155  On LR     Endo:  maintain euglycemia  A1C 5.8    Heme/onc:  SCDs, start Lovenox 40 qHS    ID: Hypothermic 4/3  F/u cultures    DNR  ICU    at risk for deterioration and death due to acute stroke, cytotoxic edema, ventricular arrhythmia, cardiac arrest  critical care time 35min

## 2019-04-04 NOTE — PROGRESS NOTE ADULT - SUBJECTIVE AND OBJECTIVE BOX
Subjective: Patient seen and examined. No new events except as noted.     SUBJECTIVE/ROS:  on vent       MEDICATIONS:  MEDICATIONS  (STANDING):  amiodarone    Tablet 200 milliGRAM(s) Oral daily  atorvastatin 40 milliGRAM(s) Oral at bedtime  chlorhexidine 0.12% Liquid 15 milliLiter(s) Oral Mucosa two times a day  chlorhexidine 4% Liquid 1 Application(s) Topical <User Schedule>  docusate sodium Liquid 100 milliGRAM(s) Oral three times a day  insulin lispro (HumaLOG) corrective regimen sliding scale   SubCutaneous every 6 hours  lactated ringers. 1000 milliLiter(s) (70 mL/Hr) IV Continuous <Continuous>  pantoprazole  Injectable 40 milliGRAM(s) IV Push two times a day  senna 2 Tablet(s) Oral at bedtime      PHYSICAL EXAM:  T(C): 37.6 (04-04-19 @ 03:00), Max: 38.1 (04-03-19 @ 19:00)  HR: 97 (04-04-19 @ 06:00) (54 - 97)  BP: -- 148/58  RR: 15 (04-04-19 @ 06:00) (11 - 24)  SpO2: 99% (04-04-19 @ 06:00) (88% - 100%)  Wt(kg): --  I&O's Summary    03 Apr 2019 07:01  -  04 Apr 2019 07:00  --------------------------------------------------------  IN: 2242.2 mL / OUT: 1165 mL / NET: 1077.2 mL          Appearance: on vent 	  Cardiovascular: Normal S1 S2,    Murmur:   Neck: JVP limited to be evaluated   Respiratory: Lungs few rhonchi   Gastrointestinal:  Soft  Skin: normal   Neuro: limited as pt on vent     LABS/DATA:    CARDIAC MARKERS:  CARDIAC MARKERS ( 03 Apr 2019 14:51 )  x     / x     / 100 U/L / x     / 3.4 ng/mL  CARDIAC MARKERS ( 02 Apr 2019 17:00 )  .362 ng/mL / x     / x     / x     / x                                    9.2    20.1  )-----------( 166      ( 04 Apr 2019 03:16 )             30.0     04-04    158<H>  |  125<H>  |  14  ----------------------------<  174<H>  4.5   |  21<L>  |  1.70<H>    Ca    9.2      04 Apr 2019 03:16  Phos  2.7     04-03  Mg     2.7     04-03    TPro  6.7  /  Alb  4.2  /  TBili  0.7  /  DBili  x   /  AST  17  /  ALT  12  /  AlkPhos  98  04-02    proBNP:   Lipid Profile:   HgA1c:   TSH:     TELE:  EKG:  < from: Transthoracic Echocardiogram (04.03.19 @ 07:48) >  Conclusions:  Normal left ventricular systolic function. No segmental  wall motion abnormalities. Moderate concentric hypertrophy.  Severe aortic stenosis. Aortic valve area by continuity  (based on a measured LVOTd of 1.7 cm) calculates to 0.4  square cm.  Mild pulmonary hypertension. Estimated PASP 42 mmHg.  ------------------------------------------------------------------------  Confirmed on  4/3/2019 - 18:34:44 by Enmanuel Murguia M.D.    < end of copied text >

## 2019-04-04 NOTE — PROVIDER CONTACT NOTE (EICU) - SITUATION
Bilateral flexion on upper extremities on painful stimulus, bilateral  triple flexion on lower extremities on painful stimulus

## 2019-04-04 NOTE — PROGRESS NOTE ADULT - SUBJECTIVE AND OBJECTIVE BOX
*************************************  VASCULAR NEUROLOGY FOLLOW UP NOTE  **************************************    ANGIE DANIEL  Female  [id number]    Subjective: overnight in torsades likely neurogenic as per nscu team.      VITAL SIGNS:  Vital Signs Last 24 Hrs  T(C): 37.8 (04 Apr 2019 11:00), Max: 38.1 (03 Apr 2019 19:00)  T(F): 100 (04 Apr 2019 11:00), Max: 100.6 (03 Apr 2019 19:00)  HR: 91 (04 Apr 2019 12:34) (57 - 97)  BP: --  BP(mean): --  RR: 14 (04 Apr 2019 11:00) (11 - 24)  SpO2: 100% (04 Apr 2019 12:34) (98% - 100%)    PHYSICAL EXAMINATION:  elderly  intubated, off sedation  does not open eyes  R pupil - 3mm, NR. L pupil - 2mm, briskly reactive  PERRL, L gaze palsy  Not following commands  spont movement on right  no movment on Left  R neck hematoma soft, no expansion    LABS:                          9.2    20.1  )-----------( 166      ( 04 Apr 2019 03:16 )             30.0     04-04    158<H>  |  125<H>  |  14  ----------------------------<  174<H>  4.5   |  21<L>  |  1.70<H>    Ca    9.2      04 Apr 2019 03:16  Phos  2.7     04-03  Mg     2.7     04-03    TPro  6.7  /  Alb  4.2  /  TBili  0.7  /  DBili  x   /  AST  17  /  ALT  12  /  AlkPhos  98  04-02    PT/INR - ( 03 Apr 2019 01:15 )   PT: 13.1 sec;   INR: 1.13 ratio         PTT - ( 03 Apr 2019 01:15 )  PTT:26.9 sec    RADIOLOGY & ADDITIONAL STUDIES:      < from: CT Head No Cont (04.04.19 @ 09:53) >  IMPRESSION:    Redemonstration of a large acute right MCA and JEANETTE territory infarct.   Increased cytotoxic edema and mass effect.     Minimally increased density in the region of the right striate nucleus   may represent spared gray matter or petechial hemorrhagic transformation.   No CT evidence of large hemorrhagic transformation.    New leftward midline shift of 8 mm. Stable ventricular size, no   hydrocephalus.    < end of copied text >

## 2019-04-04 NOTE — PROGRESS NOTE ADULT - SUBJECTIVE AND OBJECTIVE BOX
87yo woman transferred from [facility] where she was found to have R IC/MCA occlusion, outside time window for tPA, taken to angio, unsuccessful recanalization. Intraop, unable to access through R groin due to tortuous vessels, carotid artery accessed, c/b neck hematoma, which stabilized with manual compression. PMH HTN/HLD, LWK around 11am on 4/2, per  at bedside, patient drove home, where she didn't park her car right and was struggling to open the front door, when he saw her "not right" and with facial droop. BIBA to [facility] ED. NIHSS 18.     CTH with large R MCA territory infarct  evaluated by neurosurgery, not a surgical candidate due to age, large infarct, and medical comorbidities including severe aortic stenosis, which could not be operated on due to UGIB per     Vitals/labs/meds/imaging reviewed  intubated  R pupil 4mm NR, L pupil 3mm sluggish  BUE LC  BLE TF  bradycardic and irregular, 3/6 blowing systolic murmur RUSB  clear lungs  soft abd/nd  wwp ext; LLE DP pulse 1+, R DP pulse palpable  RLE groin c/d/i, no hematoma  R neck hematoma soft, no expansion    CTH in am  extensive R MCA evolving stroke and cytotoxic edema, not a surgical candidate as stated above  ASA/statin  stroke core measures  Na goal 145-155  bedrest  sev AS on TTE  -160  amio PO  intubated, pressure support as tolerated  NPO  IVF  maintain euglycemia  A1C 5.8  SCDs, hold chemoppx start tmrw if CTH stable  pan cx'ed for hypothermia    DNR  ICU    additional critical care time 45min

## 2019-04-04 NOTE — PROVIDER CONTACT NOTE (EICU) - ACTION/TREATMENT ORDERED:
ABDULKADIR Jerome and ABDULKADIR Pepper at bedside to assess. Protonix 40 mg due now, dose increased to 40mg BID. CBC to be drawn at 0300. Will continue to monitor.

## 2019-04-05 DIAGNOSIS — Z51.5 ENCOUNTER FOR PALLIATIVE CARE: ICD-10-CM

## 2019-04-05 DIAGNOSIS — R53.2 FUNCTIONAL QUADRIPLEGIA: ICD-10-CM

## 2019-04-05 DIAGNOSIS — Z71.89 OTHER SPECIFIED COUNSELING: ICD-10-CM

## 2019-04-05 DIAGNOSIS — J96.90 RESPIRATORY FAILURE, UNSPECIFIED, UNSPECIFIED WHETHER WITH HYPOXIA OR HYPERCAPNIA: ICD-10-CM

## 2019-04-05 DIAGNOSIS — I63.511 CEREBRAL INFARCTION DUE TO UNSPECIFIED OCCLUSION OR STENOSIS OF RIGHT MIDDLE CEREBRAL ARTERY: ICD-10-CM

## 2019-04-05 LAB
ANION GAP SERPL CALC-SCNC: 11 MMOL/L — SIGNIFICANT CHANGE UP (ref 5–17)
BASE EXCESS BLDA CALC-SCNC: 0.3 MMOL/L — SIGNIFICANT CHANGE UP (ref -2–2)
BUN SERPL-MCNC: 18 MG/DL — SIGNIFICANT CHANGE UP (ref 7–23)
CALCIUM SERPL-MCNC: 8.8 MG/DL — SIGNIFICANT CHANGE UP (ref 8.4–10.5)
CHLORIDE SERPL-SCNC: 121 MMOL/L — HIGH (ref 96–108)
CO2 BLDA-SCNC: 24 MMOL/L — SIGNIFICANT CHANGE UP (ref 22–30)
CO2 SERPL-SCNC: 21 MMOL/L — LOW (ref 22–31)
CREAT SERPL-MCNC: 1.37 MG/DL — HIGH (ref 0.5–1.3)
GAS PNL BLDA: SIGNIFICANT CHANGE UP
GLUCOSE SERPL-MCNC: 132 MG/DL — HIGH (ref 70–99)
HCO3 BLDA-SCNC: 23 MMOL/L — SIGNIFICANT CHANGE UP (ref 21–29)
HOROWITZ INDEX BLDA+IHG-RTO: 30 — SIGNIFICANT CHANGE UP
PCO2 BLDA: 32 MMHG — SIGNIFICANT CHANGE UP (ref 32–46)
PH BLDA: 7.47 — HIGH (ref 7.35–7.45)
PO2 BLDA: 131 MMHG — HIGH (ref 74–108)
POTASSIUM SERPL-MCNC: 3.9 MMOL/L — SIGNIFICANT CHANGE UP (ref 3.5–5.3)
POTASSIUM SERPL-SCNC: 3.9 MMOL/L — SIGNIFICANT CHANGE UP (ref 3.5–5.3)
SAO2 % BLDA: 99 % — HIGH (ref 92–96)
SODIUM SERPL-SCNC: 153 MMOL/L — HIGH (ref 135–145)

## 2019-04-05 PROCEDURE — 99292 CRITICAL CARE ADDL 30 MIN: CPT

## 2019-04-05 PROCEDURE — 99497 ADVNCD CARE PLAN 30 MIN: CPT | Mod: 25

## 2019-04-05 PROCEDURE — 99291 CRITICAL CARE FIRST HOUR: CPT

## 2019-04-05 PROCEDURE — 70450 CT HEAD/BRAIN W/O DYE: CPT | Mod: 26

## 2019-04-05 PROCEDURE — 71045 X-RAY EXAM CHEST 1 VIEW: CPT | Mod: 26

## 2019-04-05 PROCEDURE — 99223 1ST HOSP IP/OBS HIGH 75: CPT

## 2019-04-05 RX ORDER — SODIUM CHLORIDE 9 MG/ML
500 INJECTION INTRAMUSCULAR; INTRAVENOUS; SUBCUTANEOUS ONCE
Qty: 0 | Refills: 0 | Status: DISCONTINUED | OUTPATIENT
Start: 2019-04-05 | End: 2019-04-05

## 2019-04-05 RX ORDER — METOPROLOL TARTRATE 50 MG
5 TABLET ORAL ONCE
Qty: 0 | Refills: 0 | Status: COMPLETED | OUTPATIENT
Start: 2019-04-05 | End: 2019-04-05

## 2019-04-05 RX ADMIN — Medication 100 MILLIGRAM(S): at 22:59

## 2019-04-05 RX ADMIN — PANTOPRAZOLE SODIUM 40 MILLIGRAM(S): 20 TABLET, DELAYED RELEASE ORAL at 05:43

## 2019-04-05 RX ADMIN — Medication 10 MILLIGRAM(S): at 14:35

## 2019-04-05 RX ADMIN — CHLORHEXIDINE GLUCONATE 1 APPLICATION(S): 213 SOLUTION TOPICAL at 22:59

## 2019-04-05 RX ADMIN — SODIUM CHLORIDE 70 MILLILITER(S): 9 INJECTION, SOLUTION INTRAVENOUS at 05:44

## 2019-04-05 RX ADMIN — CHLORHEXIDINE GLUCONATE 15 MILLILITER(S): 213 SOLUTION TOPICAL at 18:07

## 2019-04-05 RX ADMIN — SENNA PLUS 2 TABLET(S): 8.6 TABLET ORAL at 22:59

## 2019-04-05 RX ADMIN — CHLORHEXIDINE GLUCONATE 15 MILLILITER(S): 213 SOLUTION TOPICAL at 06:00

## 2019-04-05 RX ADMIN — Medication 100 MILLIGRAM(S): at 05:43

## 2019-04-05 RX ADMIN — ATORVASTATIN CALCIUM 80 MILLIGRAM(S): 80 TABLET, FILM COATED ORAL at 22:59

## 2019-04-05 RX ADMIN — Medication 10 MILLIGRAM(S): at 23:14

## 2019-04-05 RX ADMIN — Medication 5 MILLIGRAM(S): at 18:27

## 2019-04-05 RX ADMIN — ENOXAPARIN SODIUM 40 MILLIGRAM(S): 100 INJECTION SUBCUTANEOUS at 18:07

## 2019-04-05 RX ADMIN — Medication 81 MILLIGRAM(S): at 12:40

## 2019-04-05 RX ADMIN — PANTOPRAZOLE SODIUM 40 MILLIGRAM(S): 20 TABLET, DELAYED RELEASE ORAL at 18:07

## 2019-04-05 RX ADMIN — AMIODARONE HYDROCHLORIDE 200 MILLIGRAM(S): 400 TABLET ORAL at 05:43

## 2019-04-05 NOTE — PROGRESS NOTE ADULT - SUBJECTIVE AND OBJECTIVE BOX
O: Significant midline shift and brain edema     T(C): 37.8 (04-05-19 @ 19:00), Max: 37.8 (04-05-19 @ 19:00)  HR: 60 (04-05-19 @ 21:15) (57 - 84)  BP: 126/56 (04-05-19 @ 19:00) (121/61 - 154/62)  RR: 16 (04-05-19 @ 19:00) (13 - 22)  SpO2: 100% (04-05-19 @ 21:15) (98% - 100%)  04-04-19 @ 07:01  -  04-05-19 @ 07:00  --------------------------------------------------------  IN: 1540 mL / OUT: 100 mL / NET: 1440 mL    04-05-19 @ 07:01  -  04-05-19 @ 22:10  --------------------------------------------------------  IN: 840 mL / OUT: 1200 mL / NET: -360 mL    acetaminophen    Suspension .. 650 milliGRAM(s) Oral every 6 hours PRN  acetaminophen  IVPB .. 1000 milliGRAM(s) IV Intermittent once  amiodarone    Tablet 200 milliGRAM(s) Oral daily  aspirin  chewable 81 milliGRAM(s) Oral daily  atorvastatin 80 milliGRAM(s) Oral at bedtime  chlorhexidine 0.12% Liquid 15 milliLiter(s) Oral Mucosa two times a day  chlorhexidine 4% Liquid 1 Application(s) Topical <User Schedule>  docusate sodium Liquid 100 milliGRAM(s) Oral three times a day  enoxaparin Injectable 40 milliGRAM(s) SubCutaneous <User Schedule>  hydrALAZINE Injectable 10 milliGRAM(s) IV Push every 6 hours PRN  insulin lispro (HumaLOG) corrective regimen sliding scale   SubCutaneous every 6 hours  lactated ringers. 1000 milliLiter(s) IV Continuous <Continuous>  pantoprazole  Injectable 40 milliGRAM(s) IV Push two times a day  senna 2 Tablet(s) Oral at bedtime  Mode: AC/ CMV (Assist Control/ Continuous Mandatory Ventilation), RR (machine): 16, TV (machine): 400, FiO2: 30, PEEP: 5, ITime: 1, MAP: 12, PIP: 30      intubated  Eye opening apraxia.  b/l pupils - NR. No corneals  + cough/gag. No oculocephalic reflex.  b/l UE - flexion   b/l LE - TF  Irregular rate, 3/6 blowing systolic murmur RUSB  clear lungs  soft abd/nd  Extremities - No edema  R neck hematoma soft, no expansion      LABS:  Na: 153 (04-05 @ 05:44), 154 (04-04 @ 19:58), 156 (04-04 @ 15:52), 158 (04-04 @ 03:16), 155 (04-03 @ 21:07), 150 (04-03 @ 12:41), 144 (04-03 @ 05:55), 140 (04-03 @ 01:15)  K: 3.9 (04-05 @ 05:44), 4.4 (04-04 @ 19:58), 3.4 (04-04 @ 15:52), 4.5 (04-04 @ 03:16), 4.2 (04-03 @ 21:07), 3.8 (04-03 @ 12:41), 3.9 (04-03 @ 05:55), 3.6 (04-03 @ 01:15)  Cl: 121 (04-05 @ 05:44), 122 (04-04 @ 19:58), 126 (04-04 @ 15:52), 125 (04-04 @ 03:16), 122 (04-03 @ 21:07), 116 (04-03 @ 12:41), 110 (04-03 @ 05:55), 103 (04-03 @ 01:15)  CO2: 21 (04-05 @ 05:44), 20 (04-04 @ 19:58), 20 (04-04 @ 15:52), 21 (04-04 @ 03:16), 21 (04-03 @ 21:07), 22 (04-03 @ 12:41), 18 (04-03 @ 05:55), 19 (04-03 @ 01:15)  BUN: 18 (04-05 @ 05:44), 16 (04-04 @ 19:58), 15 (04-04 @ 15:52), 14 (04-04 @ 03:16), 14 (04-03 @ 21:07), 12 (04-03 @ 12:41), 13 (04-03 @ 05:55), 13 (04-03 @ 01:15)  Cr: 1.37 (04-05 @ 05:44), 1.46 (04-04 @ 19:58), 1.43 (04-04 @ 15:52), 1.70 (04-04 @ 03:16), 1.54 (04-03 @ 21:07), 1.35 (04-03 @ 12:41), 1.16 (04-03 @ 05:55), 1.00 (04-03 @ 01:15)  Glu: 132(04-05 @ 05:44), 121(04-04 @ 19:58), 128(04-04 @ 15:52), 174(04-04 @ 03:16), 118(04-03 @ 21:07), 154(04-03 @ 12:41), 186(04-03 @ 05:55), 227(04-03 @ 01:15)    Hgb: 9.0 (04-04 @ 19:58), 9.2 (04-04 @ 03:16), 9.6 (04-03 @ 21:07), 11.6 (04-03 @ 01:15)  Hct: 27.5 (04-04 @ 19:58), 30.0 (04-04 @ 03:16), 28.2 (04-03 @ 21:07), 33.4 (04-03 @ 01:15)  WBC: 19.4 (04-04 @ 19:58), 20.1 (04-04 @ 03:16), 18.8 (04-03 @ 21:07), 10.7 (04-03 @ 01:15)  Plt: 160 (04-04 @ 19:58), 166 (04-04 @ 03:16), 155 (04-03 @ 21:07), 161 (04-03 @ 01:15)    INR: 1.13 04-03-19 @ 01:15  PTT: 26.9 04-03-19 @ 01:15        EXAM:  CT BRAIN                            PROCEDURE DATE:  04/05/2019            INTERPRETATION:    CLINICAL INDICATION: Follow-up acute right middle cerebral artery infarct    5mm axial sections of the brain were obtained from base to vertex,   without the intravenous administration of contrast material. Images were   obtained on a portable CT scanner and compared with 4/4/2019.      There has been further evolution of the acute right middle and anterior   cerebral artery infarcts with further sulcal effacement and slight mass   effect on the right lateral ventricle. There is significantly more   midline shift to the left with dilatation of the left lateral ventricle   consistent with transtentorial and subfalcine herniation. Effacement of   the basal cisterns is noted.      Impression:    Further evolution of the acute large right cerebral hemispheric infarct   since 4/4/2019 with significantly increased mass effect and midline shift   to the left. Left-sided hydrocephalus.            Assessment and Plan:   · Assessment	  R IC/MCA occlusion on CTA, no tPA, unsuccessful revascularization; NIHSS 18  Possibly cardioembolic  Concern for uncal herniation; no neurosurgical intervention offered.   Will continue goals of care conversation.     Neuro:  May progress to brain death   stroke core measures: A1C, LDL  On ASA 81 and atorvastatin to 80 qHS  monitor neck hematoma  NSGY not offering surgical intervention  Palliative consult; GOC discussion  bedrest    Card:  Torsades - On amio 200 daily (per Cardio)  Keep K > 4, Mg > 2, Phos > 3  likely sev AS - TTE  -160  hydralazine PRN    Pulm:  intubated  Peridex  GOC discussion    GI:  NPO   Bowel regimen  Gi ppx: protonix 40 iv daily    Renal: Na goal 145-155  On LR   JULIA noted    Endo:  maintain euglycemia  A1C 5.8    Heme/onc:  SCDs, on Lovenox 40 qHS    ID: Blood cultures - NGTD  Urine cultures - > 3 organisms; ?contaminant    DNR  ICU    at risk for deterioration and death due to acute stroke, cytotoxic edema, ventricular arrhythmia, cardiac arrest  critical care time 35min O: Significant midline shift and brain edema   not overbreathing the vent   urine output increasing     T(C): 37.8 (04-05-19 @ 19:00), Max: 37.8 (04-05-19 @ 19:00)  HR: 60 (04-05-19 @ 21:15) (57 - 84)  BP: 126/56 (04-05-19 @ 19:00) (121/61 - 154/62)  RR: 16 (04-05-19 @ 19:00) (13 - 22)  SpO2: 100% (04-05-19 @ 21:15) (98% - 100%)  04-04-19 @ 07:01  -  04-05-19 @ 07:00  --------------------------------------------------------  IN: 1540 mL / OUT: 100 mL / NET: 1440 mL    04-05-19 @ 07:01  -  04-05-19 @ 22:10  --------------------------------------------------------  IN: 840 mL / OUT: 1200 mL / NET: -360 mL    acetaminophen    Suspension .. 650 milliGRAM(s) Oral every 6 hours PRN  acetaminophen  IVPB .. 1000 milliGRAM(s) IV Intermittent once  amiodarone    Tablet 200 milliGRAM(s) Oral daily  aspirin  chewable 81 milliGRAM(s) Oral daily  atorvastatin 80 milliGRAM(s) Oral at bedtime  chlorhexidine 0.12% Liquid 15 milliLiter(s) Oral Mucosa two times a day  chlorhexidine 4% Liquid 1 Application(s) Topical <User Schedule>  docusate sodium Liquid 100 milliGRAM(s) Oral three times a day  enoxaparin Injectable 40 milliGRAM(s) SubCutaneous <User Schedule>  hydrALAZINE Injectable 10 milliGRAM(s) IV Push every 6 hours PRN  insulin lispro (HumaLOG) corrective regimen sliding scale   SubCutaneous every 6 hours  lactated ringers. 1000 milliLiter(s) IV Continuous <Continuous>  pantoprazole  Injectable 40 milliGRAM(s) IV Push two times a day  senna 2 Tablet(s) Oral at bedtime  Mode: AC/ CMV (Assist Control/ Continuous Mandatory Ventilation), RR (machine): 16, TV (machine): 400, FiO2: 30, PEEP: 5, ITime: 1, MAP: 12, PIP: 30      intubated  eye coled  b/l pupils - NR. has a weak left corneal  not overbreathing the vent   + cough/gag. No oculocephalic reflex.  b/l UE - 0/5  b/l LE - TF  Irregular rate, 3/6 blowing systolic murmur RUSB  clear lungs  soft abd/nd  Extremities - No edema  R neck hematoma soft, no expansion      LABS:  Na: 153 (04-05 @ 05:44), 154 (04-04 @ 19:58), 156 (04-04 @ 15:52), 158 (04-04 @ 03:16), 155 (04-03 @ 21:07), 150 (04-03 @ 12:41), 144 (04-03 @ 05:55), 140 (04-03 @ 01:15)  K: 3.9 (04-05 @ 05:44), 4.4 (04-04 @ 19:58), 3.4 (04-04 @ 15:52), 4.5 (04-04 @ 03:16), 4.2 (04-03 @ 21:07), 3.8 (04-03 @ 12:41), 3.9 (04-03 @ 05:55), 3.6 (04-03 @ 01:15)  Cl: 121 (04-05 @ 05:44), 122 (04-04 @ 19:58), 126 (04-04 @ 15:52), 125 (04-04 @ 03:16), 122 (04-03 @ 21:07), 116 (04-03 @ 12:41), 110 (04-03 @ 05:55), 103 (04-03 @ 01:15)  CO2: 21 (04-05 @ 05:44), 20 (04-04 @ 19:58), 20 (04-04 @ 15:52), 21 (04-04 @ 03:16), 21 (04-03 @ 21:07), 22 (04-03 @ 12:41), 18 (04-03 @ 05:55), 19 (04-03 @ 01:15)  BUN: 18 (04-05 @ 05:44), 16 (04-04 @ 19:58), 15 (04-04 @ 15:52), 14 (04-04 @ 03:16), 14 (04-03 @ 21:07), 12 (04-03 @ 12:41), 13 (04-03 @ 05:55), 13 (04-03 @ 01:15)  Cr: 1.37 (04-05 @ 05:44), 1.46 (04-04 @ 19:58), 1.43 (04-04 @ 15:52), 1.70 (04-04 @ 03:16), 1.54 (04-03 @ 21:07), 1.35 (04-03 @ 12:41), 1.16 (04-03 @ 05:55), 1.00 (04-03 @ 01:15)  Glu: 132(04-05 @ 05:44), 121(04-04 @ 19:58), 128(04-04 @ 15:52), 174(04-04 @ 03:16), 118(04-03 @ 21:07), 154(04-03 @ 12:41), 186(04-03 @ 05:55), 227(04-03 @ 01:15)    Hgb: 9.0 (04-04 @ 19:58), 9.2 (04-04 @ 03:16), 9.6 (04-03 @ 21:07), 11.6 (04-03 @ 01:15)  Hct: 27.5 (04-04 @ 19:58), 30.0 (04-04 @ 03:16), 28.2 (04-03 @ 21:07), 33.4 (04-03 @ 01:15)  WBC: 19.4 (04-04 @ 19:58), 20.1 (04-04 @ 03:16), 18.8 (04-03 @ 21:07), 10.7 (04-03 @ 01:15)  Plt: 160 (04-04 @ 19:58), 166 (04-04 @ 03:16), 155 (04-03 @ 21:07), 161 (04-03 @ 01:15)    INR: 1.13 04-03-19 @ 01:15  PTT: 26.9 04-03-19 @ 01:15        EXAM:  CT BRAIN                            PROCEDURE DATE:  04/05/2019            INTERPRETATION:    CLINICAL INDICATION: Follow-up acute right middle cerebral artery infarct    5mm axial sections of the brain were obtained from base to vertex,   without the intravenous administration of contrast material. Images were   obtained on a portable CT scanner and compared with 4/4/2019.      There has been further evolution of the acute right middle and anterior   cerebral artery infarcts with further sulcal effacement and slight mass   effect on the right lateral ventricle. There is significantly more   midline shift to the left with dilatation of the left lateral ventricle   consistent with transtentorial and subfalcine herniation. Effacement of   the basal cisterns is noted.      Impression:    Further evolution of the acute large right cerebral hemispheric infarct   since 4/4/2019 with significantly increased mass effect and midline shift   to the left. Left-sided hydrocephalus.            Assessment and Plan:   · Assessment	  R IC/MCA occlusion on CTA, no tPA, unsuccessful revascularization; NIHSS 18  Possibly cardioembolic  Concern for uncal herniation; no neurosurgical intervention offered.   Will continue goals of care conversation.   May progress to brain death , still has a cough and left corneal   monitor neck hematoma  NSGY not offering surgical intervention  urine output increasing, urine SG and BMP now, might be in DI   LR at 70 ml/hr   Torsades - On amio 200 daily (per Cardio)  Keep K > 4, Mg > 2, Phos > 3  likely sev AS - TTE  -160  hydralazine PRN  respiratory failure ,intubated will get ABG and adjust vent settings accoridngly Peridex  NPO   Bowel regimen  Gi ppx: protonix 40 iv daily   Na goal 145-155  On LR   JULIA noted, has increased UO might be in DI, will get BMP now STAT and urine SG if in DI will gve ddavp   SCDs, on Lovenox 40 qHS  ID: Blood cultures - NGTD  Urine cultures - > 3 organisms; ?contaminant    DNR  ICU    at risk for deterioration and death due to acute stroke, cytotoxic edema, ventricular arrhythmia, cardiac arrest  critical care time 40 min

## 2019-04-05 NOTE — CONSULT NOTE ADULT - CONSULT REASON
GOC in the setting of CVA
R ICA occlusion
VT
s/p R carotid percutaneous access for IR thrombectomy after CVA
ROSY occlusion

## 2019-04-05 NOTE — CONSULT NOTE ADULT - PROBLEM SELECTOR RECOMMENDATION 3
currently on ventilator, not overbreathing  discussed elective extubation, family considering options.

## 2019-04-05 NOTE — PROGRESS NOTE ADULT - SUBJECTIVE AND OBJECTIVE BOX
Subjective: Patient seen and examined. No new events except as noted.     SUBJECTIVE/ROS:  on vent       MEDICATIONS:  MEDICATIONS  (STANDING):  acetaminophen  IVPB .. 1000 milliGRAM(s) IV Intermittent once  amiodarone    Tablet 200 milliGRAM(s) Oral daily  aspirin  chewable 81 milliGRAM(s) Oral daily  atorvastatin 80 milliGRAM(s) Oral at bedtime  chlorhexidine 0.12% Liquid 15 milliLiter(s) Oral Mucosa two times a day  chlorhexidine 4% Liquid 1 Application(s) Topical <User Schedule>  docusate sodium Liquid 100 milliGRAM(s) Oral three times a day  enoxaparin Injectable 40 milliGRAM(s) SubCutaneous <User Schedule>  insulin lispro (HumaLOG) corrective regimen sliding scale   SubCutaneous every 6 hours  lactated ringers. 1000 milliLiter(s) (70 mL/Hr) IV Continuous <Continuous>  pantoprazole  Injectable 40 milliGRAM(s) IV Push two times a day  senna 2 Tablet(s) Oral at bedtime      PHYSICAL EXAM:  T(C): 37.1 (04-05-19 @ 06:00), Max: 38.8 (04-04-19 @ 19:00)  HR: 59 (04-05-19 @ 06:00) (58 - 94)  BP: 140/60 (04-05-19 @ 06:00) (120/69 - 152/71)  RR: 16 (04-05-19 @ 06:00) (0 - 24)  SpO2: 100% (04-05-19 @ 06:00) (99% - 100%)  Wt(kg): --  I&O's Summary    04 Apr 2019 07:01  -  05 Apr 2019 07:00  --------------------------------------------------------  IN: 1540 mL / OUT: 100 mL / NET: 1440 mL          Appearance: on vent 	  Cardiovascular: Normal S1 S2,    Murmur:   Neck: JVP limited to be evaluated   Respiratory: Lungs few rhonchi   Gastrointestinal:  Soft  Skin: normal   Neuro: limited as pt on vent     LABS/DATA:    CARDIAC MARKERS:  CARDIAC MARKERS ( 03 Apr 2019 14:51 )  x     / x     / 100 U/L / x     / 3.4 ng/mL  CARDIAC MARKERS ( 02 Apr 2019 17:00 )  .362 ng/mL / x     / x     / x     / x                                    9.0    19.4  )-----------( 160      ( 04 Apr 2019 19:58 )             27.5     04-05    153<H>  |  121<H>  |  18  ----------------------------<  132<H>  3.9   |  21<L>  |  1.37<H>    Ca    8.8      05 Apr 2019 05:44  Phos  2.6     04-04  Mg     2.4     04-04      proBNP:   Lipid Profile:   HgA1c:   TSH:     TELE:  EKG:

## 2019-04-05 NOTE — PROGRESS NOTE ADULT - ATTENDING COMMENTS
Spoke with patient's  at bedside and patient's daughter Bev via phone ([phone]).    Relayed the same information to them, namely that Mrs. Townsend suffered a massive right sided stroke and continues to have significant brain swelling despite our maximal medical management. I relayed that brain swelling after stroke occurs for several days after stroke onset and that Mrs. Townsend may develop additional neurological damage from this swelling, including brainstem compression resulting in death. We briefly discussed the quality of life Mrs. Townsend may have wanted and I raised the possibility she may need a tracheostomy, and most likely a percutaneous endoscopic gastrostomy tube should the plan be to continue full aggressive medical management and she survives her brain swelling. We rediscussed code status and confirmed that Mrs. Townsend would not want compressions should her heart stop.     Daughter Bev will be coming tomorrow to discuss further. Spoke with Dr. Betancourt. At current state, no benefit of hemicraniectomy.  aware of poor prognosis. Awaiting daughter's arrival this afternoon. Palliative C/s.

## 2019-04-05 NOTE — PROGRESS NOTE ADULT - ASSESSMENT
VT, Polymorphic  Consistent with Torsades   due to Long QT   EKG in 2017 with normal qt   this is a likely due to right sided stroke with qt prolongation and coronary ischemia   for now may cont amio as since its initiation , no further VT   keep K , Mg normal , replete MG   avoid any other qt prolonging drugs  qt is improving     AS  Severe  avoid over hydration   monitor for acute CHF    cva   likely embolic  fu with NSCU

## 2019-04-05 NOTE — CONSULT NOTE ADULT - PROBLEM SELECTOR RECOMMENDATION 4
>16 minutes spent on ACP  Plan at this time is to continue care in NSCU, [facility] team following up with family after our discussion.  Discussed comfort measures, limited blood draws.  Family considering option of elective extubation

## 2019-04-05 NOTE — CONSULT NOTE ADULT - PROBLEM SELECTOR RECOMMENDATION 2
poor prognosis for meaningful recovery  possibility of progression to brain death, discussed at length with family today

## 2019-04-05 NOTE — PROGRESS NOTE ADULT - SUBJECTIVE AND OBJECTIVE BOX
SUMMARY: 85yo woman transferred from [facility] where she was found to have R IC/MCA occlusion, outside time window for tPA, taken to angio, unsuccessful recanalization. Intraop, unable to access through R groin due to tortuous vessels, carotid artery accessed, c/b neck hematoma, which stabilized with manual compression. PMH HTN/HLD, LWK around 11am on 4/2, per  at bedside, patient drove home, where she didn't park her car right and was struggling to open the front door, when he saw her "not right" and with facial droop. BIBA to [facility] ED.     ON ADMISSION: NIHSS 18.     HOSPITAL COURSE:  4/3: -  Immediately post angio around 9pm on patient found to have R 5mm NR pupil, L 3mm R, no movements on all four extremities on propofol, which was stopped, became hypotensive, cardene d/c'ed, was on syed, given mannitol 60mg IV, unable to get IV access, emergent L fem TLC placed, taken to CTH, which showed small R contrast extravasation vs. heme but otherwise no significant edema/MLS.   - Also, noted to have multiple runs of vtachy, and hypotensive, cardene held, amio 150mg push given.    OVERNIGHT EVENTS: Patient's neurological exam worsened overnight.     Vitals/labs/meds/imaging reviewed  intubated  Eye opening apraxia.  R pupil - 3mm, NR. L pupil - 2mm, briskly reactive  PERRL, R gaze preference  b/l UE - flexion   b/l LE - TF  Irregular rate, 3/6 blowing systolic murmur RUSB  clear lungs  soft abd/nd  wwp ext; LLE DP pulse 1+, R DP pulse only dopplerable  RLE groin c/d/i, no hematoma  R neck hematoma soft, no expansion SUMMARY: 85yo woman transferred from [facility] where she was found to have R IC/MCA occlusion, outside time window for tPA, taken to angio, unsuccessful recanalization. Intraop, unable to access through R groin due to tortuous vessels, carotid artery accessed, c/b neck hematoma, which stabilized with manual compression. PMH HTN/HLD, LWK around 11am on 4/2, per  at bedside, patient drove home, where she didn't park her car right and was struggling to open the front door, when he saw her "not right" and with facial droop. BIBA to [facility] ED.     ON ADMISSION: NIHSS 18.     HOSPITAL COURSE:  4/3: -  Immediately post angio around 9pm on patient found to have R 5mm NR pupil, L 3mm R, no movements on all four extremities on propofol, which was stopped, became hypotensive, cardene d/c'ed, was on syde, given mannitol 60mg IV, unable to get IV access, emergent L fem TLC placed, taken to CTH, which showed small R contrast extravasation vs. heme but otherwise no significant edema/MLS.   - Also, noted to have multiple runs of vtachy, and hypotensive, cardene held, amio 150mg push given.    OVERNIGHT EVENTS: Patient's neurological exam worsened overnight.     Vitals/labs/meds/imaging reviewed  intubated  Eye opening apraxia.  b/l pupils - NR. No corneals  + cough/gag. No oculocephalic reflex.  b/l UE - flexion   b/l LE - TF  Irregular rate, 3/6 blowing systolic murmur RUSB  clear lungs  soft abd/nd  Extremities - No edema  R neck hematoma soft, no expansion

## 2019-04-05 NOTE — PROGRESS NOTE ADULT - ASSESSMENT
R IC/MCA occlusion on CTA, no tPA, unsuccessful revascularization; NIHSS 18  Possibly cardioembolic  Concern for uncal herniation; no neurosurgical intervention offered.   Will continue goals of care conversation.     Neuro:  Neurocheck q4H  stroke core measures: A1C, LDL  On ASA 81 and atorvastatin to 80 qHS  monitor neck hematoma  NSGY not offering surgical intervention  Palliative consult; GOC discussion  bedrest    Card:  Torsades - On amio 200 daily (per Cardio)  Keep K > 4, Mg > 2, Phos > 3  likely sev AS - TTE  -160  hydralazine PRN    Pulm:  intubated  Peridex  CPAP; GOC discussion    GI:  NPO   Bowel regimen  Gi ppx: protonix 40 iv daily    Renal: Na goal 145-155  On LR     Endo:  maintain euglycemia  A1C 5.8    Heme/onc:  SCDs, start Lovenox 40 qHS    ID: Hypothermic 4/3  F/u cultures    DNR  ICU    at risk for deterioration and death due to acute stroke, cytotoxic edema, ventricular arrhythmia, cardiac arrest  critical care time 35min R IC/MCA occlusion on CTA, no tPA, unsuccessful revascularization; NIHSS 18  Possibly cardioembolic  Concern for uncal herniation; no neurosurgical intervention offered.   Will continue goals of care conversation.     Neuro:  May progress to brain death   stroke core measures: A1C, LDL  On ASA 81 and atorvastatin to 80 qHS  monitor neck hematoma  NSGY not offering surgical intervention  Palliative consult; GOC discussion  bedrest    Card:  Torsades - On amio 200 daily (per Cardio)  Keep K > 4, Mg > 2, Phos > 3  likely sev AS - TTE  -160  hydralazine PRN    Pulm:  intubated  Peridex  GOC discussion    GI:  NPO   Bowel regimen  Gi ppx: protonix 40 iv daily    Renal: Na goal 145-155  On LR     Endo:  maintain euglycemia  A1C 5.8    Heme/onc:  SCDs, start Lovenox 40 qHS    ID: Hypothermic 4/3  F/u cultures    DNR  ICU    at risk for deterioration and death due to acute stroke, cytotoxic edema, ventricular arrhythmia, cardiac arrest  critical care time 35min R IC/MCA occlusion on CTA, no tPA, unsuccessful revascularization; NIHSS 18  Possibly cardioembolic  Concern for uncal herniation; no neurosurgical intervention offered.   Will continue goals of care conversation.     Neuro:  May progress to brain death   stroke core measures: A1C, LDL  On ASA 81 and atorvastatin to 80 qHS  monitor neck hematoma  NSGY not offering surgical intervention  Palliative consult; GOC discussion  bedrest    Card:  Torsades - On amio 200 daily (per Cardio)  Keep K > 4, Mg > 2, Phos > 3  likely sev AS - TTE  -160  hydralazine PRN    Pulm:  intubated  Peridex  GOC discussion    GI:  NPO   Bowel regimen  Gi ppx: protonix 40 iv daily    Renal: Na goal 145-155  On LR   JULIA noted    Endo:  maintain euglycemia  A1C 5.8    Heme/onc:  SCDs, on Lovenox 40 qHS    ID: Blood cultures - NGTD  Urine cultures - > 3 organisms; ?contaminant    DNR  ICU    at risk for deterioration and death due to acute stroke, cytotoxic edema, ventricular arrhythmia, cardiac arrest  critical care time 35min

## 2019-04-05 NOTE — CONSULT NOTE ADULT - ASSESSMENT
86F here with ROSY occlusion, CTH demonstrating large MCA and JEANETTE territory completed infarction. Given devastating neurological injury / poor overall prognosis / and significant medical comorbidities patient is not a candidate for any neurosurgical intervention. Recommend continued supportive care per ICU and GOC discussion with family.
86y Female PMH HTN HLD tx from [place] w/ L weakness, L facial droop, R gaze palsy, L neglect. NIHSS 18, MRS 0. 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     Recs:   CT perfusion pending  MRI brain without contrast  Aspirin for secondary stroke prevention at this time. Atorvastatin 80, titrate the dose according to LDL.   TTE with bubble study and telemetry to look for a cardiac source of embolism.   DVT prophylaxis, Neurochecks  Permissive HTN up to 220/120 mmHg for first 24 hours after the symptom onset followed by gradual normotension.   HbA1C and LDL.   PT/OT/Speech and swallow/safety eval.
ASSESSMENT: Patient is a 86y old f with R sided ischemic stroke secondary to ICA / MCA occlusion s/p IR thrombectomy via R fem and R carotid approach, with R neck hematoma    PLAN:   - Serial exams of R neck to evaluate for expansion of hematoma  - Please obtain urgent Duplex ultrasound of R carotid artery  - Discussed with ABDULKADIR Avalos of NSCU  - Rest of care as per neuro teams  - Patient and plan discussed with Fellow, Dr. Velazco, on behalf of attending, Dr. Groves.     Raoul Gaxiola MD PGY4
VT, Polymorphic  Consistent with [facility]   due to Long QT   EKG in 2017 with normal qt   this is a likely due to right sided stroke with qt prolongation and coronary ischemia   for now may cont amio as since its initiation , no further VT   obtain echo  keep K , Mg normal   avoid any other qt prolonging drugs    cva   likely embolic  eventual ILR  echo  MAHNAZ
86y Female PMH HTN HLD tx from [place] w/ L weakness, L facial droop, R gaze palsy, L neglect. Found to have large cerebral hemispheric infarct with mass effect and midline shift. Palliative care consulted for GOC.

## 2019-04-05 NOTE — CONSULT NOTE ADULT - PROBLEM SELECTOR RECOMMENDATION 5
Will remain available for support as needed  PCU would be an option if family not considering organ donation.  If patient not a candidate and does not progress to brain death, please notify PCU team on call to consider transfer or if recommendations needed for symptom directed care. Thank you for involving us in the care of this patient.

## 2019-04-05 NOTE — CONSULT NOTE ADULT - SUBJECTIVE AND OBJECTIVE BOX
HPI:  86y Female PMH HTN HLD tx from [place] w/ L weakness, L facial droop, R gaze palsy, L neglect. LKW 1150. Patient was driving her car when symptoms started, went to  ED where she was found w/ occlusion of right internal carotid and middle cerebral arteries starting at the bulb, NIHSS 17. Patient functional at baseline, able to handle ADLs without issue. Transferred to [facility] for possible endovascular intervention, TPA not given due to outside of window (2019 03:34)    PERTINENT PM/SXH:   HLD (hyperlipidemia)  HTN (hypertension)  Hyperlipidemia  Hypertension    No significant past surgical history    FAMILY HISTORY:  No pertinent family history in first degree relatives    ITEMS NOT CHECKED ARE NOT PRESENT    SOCIAL HISTORY:   Significant other/partner:  [ ]  Children:  [ ]  Jain/Spirituality:  Substance hx:  [ ]   Tobacco hx:  [ ]   Alcohol hx: [ ]   Home Opioid hx:  [ ] I-Stop Reference No:  Living Situation: [ ]Home  [ ]Long term care  [ ]Rehab [ ]Other    ADVANCE DIRECTIVES:    DNR  Yes  MOLST  [ ]  Living Will  [ ]   DECISION MAKER(s):  [ ] Health Care Proxy(s)  [ ] Surrogate(s)  [ ] Guardian           Name(s): Phone Number(s):    BASELINE (I)ADL(s) (prior to admission):  [place]: [ ]Total  [ ] Moderate [ ]Dependent    Allergies    No Known Allergies    Intolerances    MEDICATIONS  (STANDING):  acetaminophen  IVPB .. 1000 milliGRAM(s) IV Intermittent once  amiodarone    Tablet 200 milliGRAM(s) Oral daily  aspirin  chewable 81 milliGRAM(s) Oral daily  atorvastatin 80 milliGRAM(s) Oral at bedtime  chlorhexidine 0.12% Liquid 15 milliLiter(s) Oral Mucosa two times a day  chlorhexidine 4% Liquid 1 Application(s) Topical <User Schedule>  docusate sodium Liquid 100 milliGRAM(s) Oral three times a day  enoxaparin Injectable 40 milliGRAM(s) SubCutaneous <User Schedule>  insulin lispro (HumaLOG) corrective regimen sliding scale   SubCutaneous every 6 hours  lactated ringers. 1000 milliLiter(s) (70 mL/Hr) IV Continuous <Continuous>  pantoprazole  Injectable 40 milliGRAM(s) IV Push two times a day  senna 2 Tablet(s) Oral at bedtime    MEDICATIONS  (PRN):  acetaminophen    Suspension .. 650 milliGRAM(s) Oral every 6 hours PRN Temp greater or equal to 38C (100.4F), Mild Pain (1 - 3)  hydrALAZINE Injectable 10 milliGRAM(s) IV Push every 6 hours PRN SBP >160    PRESENT SYMPTOMS: [ ]Unable to obtain due to poor mentation   Source if other than patient:  [ ]Family   [ ]Team     Pain (Impact on QOL):    Location -         Minimal acceptable level (0-10 scale):                    Aggrevating factors -  Quality -  Radiation -  Severity (0-10 scale) -    Timing -    PAIN AD Score:     [email or web address] (press ctrl +  left click to view)    Dyspnea:                           [ ]Mild [ ]Moderate [ ]Severe  Anxiety:                             [ ]Mild [ ]Moderate [ ]Severe  Fatigue:                             [ ]Mild [ ]Moderate [ ]Severe  Nausea:                             [ ]Mild [ ]Moderate [ ]Severe  Loss of appetite:              [ ]Mild [ ]Moderate [ ]Severe  Constipation:                    [ ]Mild [ ]Moderate [ ]Severe    Other Symptoms:  [ ]All other review of systems negative     Karnofsky Performance Score/Palliative Performance Status Version 2:         %  PHYSICAL EXAM:  Vital Signs Last 24 Hrs  T(C): 37.1 (2019 07:00), Max: 38.8 (2019 19:00)  T(F): 98.8 (2019 07:00), Max: 101.8 (2019 19:00)  HR: 66 (2019 09:07) (58 - 94)  BP: 139/60 (2019 08:00) (120/69 - 152/71)  BP(mean): 0 (2019 08:00) (0 - 92)  RR: 16 (2019 08:00) (0 - 24)  SpO2: 100% (2019 09:07) (99% - 100%) I&O's Summary    2019 07:01  -  2019 07:00  --------------------------------------------------------  IN: 1540 mL / OUT: 100 mL / NET: 1440 mL    GENERAL:  [ ]Alert  [ ]Oriented x   [ ]Lethargic  [ ]Cachexia  [ ]Unarousable  [ ]Verbal  [ ]Non-Verbal  Behavioral:   [ ] Anxiety  [ ] Delirium [ ] Agitation [ ] Other  HEENT:  [ ]Normal   [ ]Dry mouth   [ ]ET Tube/Trach  [ ]Oral lesions  PULMONARY:   [ ]Clear [ ]Tachypnea  [ ]Audible excessive secretions   [ ]Rhonchi        [ ]Right [ ]Left [ ]Bilateral  [ ]Crackles        [ ]Right [ ]Left [ ]Bilateral  [ ]Wheezing     [ ]Right [ ]Left [ ]Bilateral  CARDIOVASCULAR:    [ ]Regular [ ]Irregular [ ]Tachy  [ ]Jeff [ ]Murmur [ ]Other  GASTROINTESTINAL:  [ ]Soft  [ ]Distended   [ ]+BS  [ ]Non tender [ ]Tender  [ ]PEG [ ]OGT/ NGT  Last BM:   GENITOURINARY:  [ ]Normal [ ] Incontinent   [ ]Oliguria/Anuria   [ ]Mcgee  MUSCULOSKELETAL:   [ ]Normal   [ ]Weakness  [ ]Bed/Wheelchair bound [ ]Edema  NEUROLOGIC:   [ ]No focal deficits  [ ] Cognitive impairment  [ ] Dysphagia [ ]Dysarthria [ ] Paresis [ ]Other   SKIN:   [ ]Normal   [ ]Pressure ulcer(s)  [ ]Rash    CRITICAL CARE:  [ ] Shock Present  [ ]Septic [ ]Cardiogenic [ ]Neurologic [ ]Hypovolemic  [ ]  Vasopressors [ ]  Inotropes   [ ] Respiratory failure present  [ ] Acute  [ ] Chronic [ ] Hypoxic  [ ] Hypercarbic [ ] Other  [ ] Other organ failure     LABS:                        9.0    19.4  )-----------( 160      ( 2019 19:58 )             27.5   04-05    153<H>  |  121<H>  |  18  ----------------------------<  132<H>  3.9   |  21<L>  |  1.37<H>    Ca    8.8      2019 05:44  Phos  2.6     04-04  Mg     2.4     04-04        Urinalysis Basic - ( 2019 11:02 )    Color: Colorless / Appearance: Clear / S.050 / pH: x  Gluc: x / Ketone: Negative  / Bili: Negative / Urobili: Negative   Blood: x / Protein: Trace / Nitrite: Negative   Leuk Esterase: Negative / RBC: 4 /hpf / WBC 2 /HPF   Sq Epi: x / Non Sq Epi: 0 /hpf / Bacteria: Negative      RADIOLOGY & ADDITIONAL STUDIES:    PROTEIN CALORIE MALNUTRITION:   [ ] PPSV2 < or = to 30% [ ] significant weight loss  [ ] poor nutritional intake [ ] catabolic state [ ] anasarca     Albumin, Serum: 4.2 g/dL (19 @ 18:07)  Artificial Nutrition [ ]     REFERRALS:   [ ]Chaplaincy  [ ] Hospice  [ ]Child Life  [ ]Social Work  [ ]Case management [ ]Holistic Therapy   Goals of Care Discussion Document: HPI:  86y Female PMH HTN HLD tx from [place] w/ L weakness, L facial droop, R gaze palsy, L neglect. LKW 1150. Patient was driving her car when symptoms started, went to  ED where she was found w/ occlusion of right internal carotid and middle cerebral arteries starting at the bulb, NIHSS 17. Patient functional at baseline, able to handle ADLs without issue. Transferred to [facility] for possible endovascular intervention, TPA not given due to outside of window (2019 03:34)    Palliative care consulted to assist with GOC in a patient found to have large acute right cerebral hemispheric infarct with mass effect and midline shift. This provider along with NSCU attending, NSCU SW, patients RN and Patient  met with patients , daughter and son in law to discuss current clinical picture. Dr. Tucker provided medical update in terms of significant swelling and poor neurologic exam with poor prognosis for meaningful recovery and possibility of progression to brain death. We discussed options for care moving forward in terms of PCU transfer and elective extubation vs. ongoing care in NSCU with extubation vs. awaiting possible progression of disease. Family tearful and emotional support provided. Family elected to continue current care and consider options available. Lacy notified of conversation and met with family after meeting.     PERTINENT PM/SXH:   HLD (hyperlipidemia)  HTN (hypertension)  Hyperlipidemia  Hypertension    No significant past surgical history    FAMILY HISTORY:  No pertinent family history in first degree relatives    ITEMS NOT CHECKED ARE NOT PRESENT    SOCIAL HISTORY:   Significant other/partner:  [ x]  Children:  [x ]  Sabianist/Spirituality: Sikhism  Substance hx:  [ ]   Tobacco hx:  [ ]   Alcohol hx: [ ]   Home Opioid hx:  [ ] I-Stop Reference No:  Living Situation: [ x]Home  [ ]Long term care  [ ]Rehab [ ]Other    ADVANCE DIRECTIVES:    DNR  Yes  MOLST  [x ]  Living Will  [ ]   DECISION MAKER(s):  [ ] Health Care Proxy(s)  [ x] Surrogate(s)  [ ] Guardian           Name(s): Phone Number(s):  Juan    BASELINE (I)ADL(s) (prior to admission):  [place]: [ ]Total  [x ] Moderate [ ]Dependent    Allergies    No Known Allergies    Intolerances    MEDICATIONS  (STANDING):  acetaminophen  IVPB .. 1000 milliGRAM(s) IV Intermittent once  amiodarone    Tablet 200 milliGRAM(s) Oral daily  aspirin  chewable 81 milliGRAM(s) Oral daily  atorvastatin 80 milliGRAM(s) Oral at bedtime  chlorhexidine 0.12% Liquid 15 milliLiter(s) Oral Mucosa two times a day  chlorhexidine 4% Liquid 1 Application(s) Topical <User Schedule>  docusate sodium Liquid 100 milliGRAM(s) Oral three times a day  enoxaparin Injectable 40 milliGRAM(s) SubCutaneous <User Schedule>  insulin lispro (HumaLOG) corrective regimen sliding scale   SubCutaneous every 6 hours  lactated ringers. 1000 milliLiter(s) (70 mL/Hr) IV Continuous <Continuous>  pantoprazole  Injectable 40 milliGRAM(s) IV Push two times a day  senna 2 Tablet(s) Oral at bedtime    MEDICATIONS  (PRN):  acetaminophen    Suspension .. 650 milliGRAM(s) Oral every 6 hours PRN Temp greater or equal to 38C (100.4F), Mild Pain (1 - 3)  hydrALAZINE Injectable 10 milliGRAM(s) IV Push every 6 hours PRN SBP >160    PRESENT SYMPTOMS: [x ]Unable to obtain due to poor mentation   Source if other than patient:  [ ]Family   [ ]Team     Pain (Impact on QOL):    Location -         Minimal acceptable level (0-10 scale):                    Aggrevating factors -  Quality -  Radiation -  Severity (0-10 scale) -    Timing -    PAIN AD Score:     [email or web address] (press ctrl +  left click to view)    Dyspnea:                           [ ]Mild [ ]Moderate [ ]Severe  Anxiety:                             [ ]Mild [ ]Moderate [ ]Severe  Fatigue:                             [ ]Mild [ ]Moderate [ ]Severe  Nausea:                             [ ]Mild [ ]Moderate [ ]Severe  Loss of appetite:              [ ]Mild [ ]Moderate [ ]Severe  Constipation:                    [ ]Mild [ ]Moderate [ ]Severe    Other Symptoms:  [ ]All other review of systems negative     Karnofsky Performance Score/Palliative Performance Status Version 2:     10    %  PHYSICAL EXAM:  Vital Signs Last 24 Hrs  T(C): 37.1 (2019 07:00), Max: 38.8 (2019 19:00)  T(F): 98.8 (2019 07:00), Max: 101.8 (2019 19:00)  HR: 66 (2019 09:07) (58 - 94)  BP: 139/60 (2019 08:00) (120/69 - 152/71)  BP(mean): 0 (2019 08:00) (0 - 92)  RR: 16 (2019 08:00) (0 - 24)  SpO2: 100% (2019 09:07) (99% - 100%) I&O's Summary    2019 07:01  -  2019 07:00  --------------------------------------------------------  IN: 1540 mL / OUT: 100 mL / NET: 1440 mL    GENERAL:  [ ]Alert  [ ]Oriented x   [ ]Lethargic  [ ]Cachexia  [ x]Unarousable  [ ]Verbal  [ ]Non-Verbal  Behavioral:   [ ] Anxiety  [ ] Delirium [ ] Agitation [ ] Other  HEENT:  [ ]Normal   [ ]Dry mouth   [ x]ET Tube/Trach  [ ]Oral lesions  PULMONARY: on vent  [ x]Clear [ ]Tachypnea  [ ]Audible excessive secretions   [ ]Rhonchi        [ ]Right [ ]Left [ ]Bilateral  [ ]Crackles        [ ]Right [ ]Left [ ]Bilateral  [ ]Wheezing     [ ]Right [ ]Left [ ]Bilateral  CARDIOVASCULAR:    [x ]Regular [ ]Irregular [ ]Tachy  [ ]Jeff [ ]Murmur [ ]Other  GASTROINTESTINAL:  [x ]Soft  [ ]Distended   [x ]+BS  [x ]Non tender [ ]Tender  [ ]PEG [ ]OGT/ NGT   GENITOURINARY:  [ ]Normal [ ] Incontinent   [ ]Oliguria/Anuria   [x ]Mcgee  MUSCULOSKELETAL:   [ ]Normal   [ ]Weakness  [x ]Bed/Wheelchair bound [ ]Edema  NEUROLOGIC: unresponsive, weak cough, pupils fixed and dilated, no dolls eyes, no responsive to verbal or tactile stimuli  [ ]No focal deficits  [ ] Cognitive impairment  [ ] Dysphagia [ ]Dysarthria [ ] Paresis [ ]Other   SKIN: ecchymoses  [ ]Normal   [ ]Pressure ulcer(s)  [ ]Rash    CRITICAL CARE:  [ ] Shock Present  [ ]Septic [ ]Cardiogenic [ ]Neurologic [ ]Hypovolemic  [ ]  Vasopressors [ ]  Inotropes   [x ] Respiratory failure present  [x ] Acute  [ ] Chronic [ ] Hypoxic  [ ] Hypercarbic [ ] Other  [ ] Other organ failure     LABS:                        9.0    19.4  )-----------( 160      ( 2019 19:58 )             27.5   04-05    153<H>  |  121<H>  |  18  ----------------------------<  132<H>  3.9   |  21<L>  |  1.37<H>    Ca    8.8      2019 05:44  Phos  2.6     -  Mg     2.4     -    Urinalysis Basic - ( 2019 11:02 )    Color: Colorless / Appearance: Clear / S.050 / pH: x  Gluc: x / Ketone: Negative  / Bili: Negative / Urobili: Negative   Blood: x / Protein: Trace / Nitrite: Negative   Leuk Esterase: Negative / RBC: 4 /hpf / WBC 2 /HPF   Sq Epi: x / Non Sq Epi: 0 /hpf / Bacteria: Negative      RADIOLOGY & ADDITIONAL STUDIES:  < from: CT Head No Cont (19 @ 10:07) >    EXAM:  CT BRAIN                            PROCEDURE DATE:  2019            INTERPRETATION:    CLINICAL INDICATION: Follow-up acute right middle cerebral artery infarct    5mm axial sections of the brain were obtained from base to vertex,   without the intravenous administration of contrast material. Images were   obtained on a portable CT scanner and compared with 2019.      There has been further evolution of the acute right middle and anterior   cerebral artery infarcts with further sulcal effacement and slight mass   effect on the right lateral ventricle. There is significantly more   midline shift to the left with dilatation of the left lateral ventricle   consistent with transtentorial and subfalcine herniation. Effacement of   the basal cisterns is noted.      Impression:    Further evolution of the acute large right cerebral hemispheric infarct   since 2019 with significantly increased mass effect and midline shift   to the left. Left-sided hydrocephalus.    SANJUANA LAKHANI M.D., ATTENDING RADIOLOGIST  This document has been electronically signed. 2019  2:16PM  < end of copied text >    PROTEIN CALORIE MALNUTRITION:   [ x] PPSV2 < or = to 30% [ ] significant weight loss  [ ] poor nutritional intake [ ] catabolic state [ ] anasarca     Albumin, Serum: 4.2 g/dL (19 @ 18:07)  Artificial Nutrition [ ]     REFERRALS:   [x ]Chaplaincy  [ ] Hospice  [ x]Child Life  [ x]Social Work  [ ]Case management [ ]Holistic Therapy   Goals of Care Discussion Document: none

## 2019-04-06 LAB
ALBUMIN SERPL ELPH-MCNC: 2.1 G/DL — LOW (ref 3.3–5)
ALBUMIN SERPL ELPH-MCNC: 2.3 G/DL — LOW (ref 3.3–5)
ALBUMIN SERPL ELPH-MCNC: 2.5 G/DL — LOW (ref 3.3–5)
ALBUMIN SERPL ELPH-MCNC: 2.7 G/DL — LOW (ref 3.3–5)
ALBUMIN SERPL ELPH-MCNC: 2.9 G/DL — LOW (ref 3.3–5)
ALP SERPL-CCNC: 71 U/L — SIGNIFICANT CHANGE UP (ref 40–120)
ALP SERPL-CCNC: 77 U/L — SIGNIFICANT CHANGE UP (ref 40–120)
ALP SERPL-CCNC: 78 U/L — SIGNIFICANT CHANGE UP (ref 40–120)
ALP SERPL-CCNC: 86 U/L — SIGNIFICANT CHANGE UP (ref 40–120)
ALP SERPL-CCNC: 86 U/L — SIGNIFICANT CHANGE UP (ref 40–120)
ALT FLD-CCNC: 13 U/L — SIGNIFICANT CHANGE UP (ref 10–45)
ALT FLD-CCNC: 13 U/L — SIGNIFICANT CHANGE UP (ref 10–45)
ALT FLD-CCNC: 14 U/L — SIGNIFICANT CHANGE UP (ref 10–45)
AMYLASE P1 CFR SERPL: 22 U/L — LOW (ref 25–125)
AMYLASE P1 CFR SERPL: 23 U/L — LOW (ref 25–125)
AMYLASE P1 CFR SERPL: 27 U/L — SIGNIFICANT CHANGE UP (ref 25–125)
AMYLASE P1 CFR SERPL: 27 U/L — SIGNIFICANT CHANGE UP (ref 25–125)
AMYLASE P1 CFR SERPL: 35 U/L — SIGNIFICANT CHANGE UP (ref 25–125)
ANION GAP SERPL CALC-SCNC: 10 MMOL/L — SIGNIFICANT CHANGE UP (ref 5–17)
ANION GAP SERPL CALC-SCNC: 11 MMOL/L — SIGNIFICANT CHANGE UP (ref 5–17)
ANION GAP SERPL CALC-SCNC: 12 MMOL/L — SIGNIFICANT CHANGE UP (ref 5–17)
ANION GAP SERPL CALC-SCNC: 12 MMOL/L — SIGNIFICANT CHANGE UP (ref 5–17)
ANION GAP SERPL CALC-SCNC: 13 MMOL/L — SIGNIFICANT CHANGE UP (ref 5–17)
APPEARANCE UR: ABNORMAL
APTT BLD: 27.6 SEC — SIGNIFICANT CHANGE UP (ref 27.5–36.3)
APTT BLD: 29.7 SEC — SIGNIFICANT CHANGE UP (ref 27.5–36.3)
APTT BLD: 31.9 SEC — SIGNIFICANT CHANGE UP (ref 27.5–36.3)
APTT BLD: 32.2 SEC — SIGNIFICANT CHANGE UP (ref 27.5–36.3)
APTT BLD: 33.7 SEC — SIGNIFICANT CHANGE UP (ref 27.5–36.3)
AST SERPL-CCNC: 19 U/L — SIGNIFICANT CHANGE UP (ref 10–40)
AST SERPL-CCNC: 20 U/L — SIGNIFICANT CHANGE UP (ref 10–40)
AST SERPL-CCNC: 23 U/L — SIGNIFICANT CHANGE UP (ref 10–40)
AST SERPL-CCNC: 24 U/L — SIGNIFICANT CHANGE UP (ref 10–40)
AST SERPL-CCNC: 32 U/L — SIGNIFICANT CHANGE UP (ref 10–40)
BACTERIA # UR AUTO: NEGATIVE — SIGNIFICANT CHANGE UP
BASE EXCESS BLDA CALC-SCNC: -0.1 MMOL/L — SIGNIFICANT CHANGE UP (ref -2–2)
BASE EXCESS BLDA CALC-SCNC: -0.6 MMOL/L — SIGNIFICANT CHANGE UP (ref -2–2)
BASE EXCESS BLDA CALC-SCNC: -1.7 MMOL/L — SIGNIFICANT CHANGE UP (ref -2–2)
BASE EXCESS BLDA CALC-SCNC: -2.9 MMOL/L — LOW (ref -2–2)
BASE EXCESS BLDA CALC-SCNC: -3.4 MMOL/L — LOW (ref -2–2)
BASE EXCESS BLDA CALC-SCNC: -3.6 MMOL/L — LOW (ref -2–2)
BILIRUB DIRECT SERPL-MCNC: 0.2 MG/DL — SIGNIFICANT CHANGE UP (ref 0–0.2)
BILIRUB INDIRECT FLD-MCNC: 0.3 MG/DL — SIGNIFICANT CHANGE UP (ref 0.2–1)
BILIRUB INDIRECT FLD-MCNC: 0.4 MG/DL — SIGNIFICANT CHANGE UP (ref 0.2–1)
BILIRUB INDIRECT FLD-MCNC: 0.5 MG/DL — SIGNIFICANT CHANGE UP (ref 0.2–1)
BILIRUB SERPL-MCNC: 0.5 MG/DL — SIGNIFICANT CHANGE UP (ref 0.2–1.2)
BILIRUB SERPL-MCNC: 0.6 MG/DL — SIGNIFICANT CHANGE UP (ref 0.2–1.2)
BILIRUB SERPL-MCNC: 0.7 MG/DL — SIGNIFICANT CHANGE UP (ref 0.2–1.2)
BILIRUB UR-MCNC: NEGATIVE — SIGNIFICANT CHANGE UP
BLD GP AB SCN SERPL QL: NEGATIVE — SIGNIFICANT CHANGE UP
BLOOD GAS COMMENTS ARTERIAL: SIGNIFICANT CHANGE UP
BUN SERPL-MCNC: 16 MG/DL — SIGNIFICANT CHANGE UP (ref 7–23)
BUN SERPL-MCNC: 16 MG/DL — SIGNIFICANT CHANGE UP (ref 7–23)
BUN SERPL-MCNC: 17 MG/DL — SIGNIFICANT CHANGE UP (ref 7–23)
CALCIUM SERPL-MCNC: 7.8 MG/DL — LOW (ref 8.4–10.5)
CALCIUM SERPL-MCNC: 8 MG/DL — LOW (ref 8.4–10.5)
CALCIUM SERPL-MCNC: 8.4 MG/DL — SIGNIFICANT CHANGE UP (ref 8.4–10.5)
CALCIUM SERPL-MCNC: 8.6 MG/DL — SIGNIFICANT CHANGE UP (ref 8.4–10.5)
CALCIUM SERPL-MCNC: 9.5 MG/DL — SIGNIFICANT CHANGE UP (ref 8.4–10.5)
CHLORIDE SERPL-SCNC: 122 MMOL/L — HIGH (ref 96–108)
CHLORIDE SERPL-SCNC: 123 MMOL/L — HIGH (ref 96–108)
CHLORIDE SERPL-SCNC: 127 MMOL/L — HIGH (ref 96–108)
CHLORIDE SERPL-SCNC: 130 MMOL/L — HIGH (ref 96–108)
CHLORIDE SERPL-SCNC: 131 MMOL/L — HIGH (ref 96–108)
CK MB BLD-MCNC: 3.8 % — HIGH (ref 0–3.5)
CK MB BLD-MCNC: 4.7 % — HIGH (ref 0–3.5)
CK MB BLD-MCNC: 5.3 % — HIGH (ref 0–3.5)
CK MB CFR SERPL CALC: 2.2 NG/ML — SIGNIFICANT CHANGE UP (ref 0–3.8)
CK MB CFR SERPL CALC: 2.3 NG/ML — SIGNIFICANT CHANGE UP (ref 0–3.8)
CK MB CFR SERPL CALC: 2.9 NG/ML — SIGNIFICANT CHANGE UP (ref 0–3.8)
CK MB CFR SERPL CALC: 3.1 NG/ML — SIGNIFICANT CHANGE UP (ref 0–3.8)
CK MB CFR SERPL CALC: 3.8 NG/ML — SIGNIFICANT CHANGE UP (ref 0–3.8)
CK SERPL-CCNC: 101 U/L — SIGNIFICANT CHANGE UP (ref 25–170)
CK SERPL-CCNC: 43 U/L — SIGNIFICANT CHANGE UP (ref 25–170)
CK SERPL-CCNC: 44 U/L — SIGNIFICANT CHANGE UP (ref 25–170)
CK SERPL-CCNC: 59 U/L — SIGNIFICANT CHANGE UP (ref 25–170)
CK SERPL-CCNC: 62 U/L — SIGNIFICANT CHANGE UP (ref 25–170)
CO2 BLDA-SCNC: 22 MMOL/L — SIGNIFICANT CHANGE UP (ref 22–30)
CO2 BLDA-SCNC: 23 MMOL/L — SIGNIFICANT CHANGE UP (ref 22–30)
CO2 BLDA-SCNC: 24 MMOL/L — SIGNIFICANT CHANGE UP (ref 22–30)
CO2 BLDA-SCNC: 24 MMOL/L — SIGNIFICANT CHANGE UP (ref 22–30)
CO2 BLDA-SCNC: 25 MMOL/L — SIGNIFICANT CHANGE UP (ref 22–30)
CO2 BLDA-SCNC: 26 MMOL/L — SIGNIFICANT CHANGE UP (ref 22–30)
CO2 SERPL-SCNC: 19 MMOL/L — LOW (ref 22–31)
CO2 SERPL-SCNC: 19 MMOL/L — LOW (ref 22–31)
CO2 SERPL-SCNC: 21 MMOL/L — LOW (ref 22–31)
CO2 SERPL-SCNC: 21 MMOL/L — LOW (ref 22–31)
CO2 SERPL-SCNC: 22 MMOL/L — SIGNIFICANT CHANGE UP (ref 22–31)
COLOR SPEC: YELLOW — SIGNIFICANT CHANGE UP
COMMENT - URINE: SIGNIFICANT CHANGE UP
CREAT SERPL-MCNC: 1.02 MG/DL — SIGNIFICANT CHANGE UP (ref 0.5–1.3)
CREAT SERPL-MCNC: 1.11 MG/DL — SIGNIFICANT CHANGE UP (ref 0.5–1.3)
CREAT SERPL-MCNC: 1.12 MG/DL — SIGNIFICANT CHANGE UP (ref 0.5–1.3)
CREAT SERPL-MCNC: 1.13 MG/DL — SIGNIFICANT CHANGE UP (ref 0.5–1.3)
CREAT SERPL-MCNC: 1.17 MG/DL — SIGNIFICANT CHANGE UP (ref 0.5–1.3)
DIFF PNL FLD: ABNORMAL
EPI CELLS # UR: 4 /HPF — SIGNIFICANT CHANGE UP
GAS PNL BLDA: SIGNIFICANT CHANGE UP
GLUCOSE SERPL-MCNC: 128 MG/DL — HIGH (ref 70–99)
GLUCOSE SERPL-MCNC: 186 MG/DL — HIGH (ref 70–99)
GLUCOSE SERPL-MCNC: 203 MG/DL — HIGH (ref 70–99)
GLUCOSE SERPL-MCNC: 213 MG/DL — HIGH (ref 70–99)
GLUCOSE SERPL-MCNC: 272 MG/DL — HIGH (ref 70–99)
GLUCOSE UR QL: NEGATIVE — SIGNIFICANT CHANGE UP
HCO3 BLDA-SCNC: 20 MMOL/L — LOW (ref 21–29)
HCO3 BLDA-SCNC: 22 MMOL/L — SIGNIFICANT CHANGE UP (ref 21–29)
HCO3 BLDA-SCNC: 22 MMOL/L — SIGNIFICANT CHANGE UP (ref 21–29)
HCO3 BLDA-SCNC: 23 MMOL/L — SIGNIFICANT CHANGE UP (ref 21–29)
HCO3 BLDA-SCNC: 24 MMOL/L — SIGNIFICANT CHANGE UP (ref 21–29)
HCO3 BLDA-SCNC: 24 MMOL/L — SIGNIFICANT CHANGE UP (ref 21–29)
HCT VFR BLD CALC: 27 % — LOW (ref 34.5–45)
HCT VFR BLD CALC: 27.2 % — LOW (ref 34.5–45)
HCT VFR BLD CALC: 28.7 % — LOW (ref 34.5–45)
HCT VFR BLD CALC: 29.5 % — LOW (ref 34.5–45)
HCT VFR BLD CALC: 32.4 % — LOW (ref 34.5–45)
HGB BLD-MCNC: 10.3 G/DL — LOW (ref 11.5–15.5)
HGB BLD-MCNC: 8.8 G/DL — LOW (ref 11.5–15.5)
HGB BLD-MCNC: 9.1 G/DL — LOW (ref 11.5–15.5)
HGB BLD-MCNC: 9.2 G/DL — LOW (ref 11.5–15.5)
HGB BLD-MCNC: 9.3 G/DL — LOW (ref 11.5–15.5)
HOROWITZ INDEX BLDA+IHG-RTO: 100 — SIGNIFICANT CHANGE UP
HOROWITZ INDEX BLDA+IHG-RTO: 100 — SIGNIFICANT CHANGE UP
HOROWITZ INDEX BLDA+IHG-RTO: 30 — SIGNIFICANT CHANGE UP
HYALINE CASTS # UR AUTO: 1 /LPF — SIGNIFICANT CHANGE UP (ref 0–7)
INR BLD: 1.25 RATIO — HIGH (ref 0.88–1.16)
INR BLD: 1.33 RATIO — HIGH (ref 0.88–1.16)
INR BLD: 1.38 RATIO — HIGH (ref 0.88–1.16)
INR BLD: 1.4 RATIO — HIGH (ref 0.88–1.16)
INR BLD: 1.44 RATIO — HIGH (ref 0.88–1.16)
KETONES UR-MCNC: NEGATIVE — SIGNIFICANT CHANGE UP
LEUKOCYTE ESTERASE UR-ACNC: NEGATIVE — SIGNIFICANT CHANGE UP
LIDOCAIN IGE QN: 6 U/L — LOW (ref 7–60)
LIDOCAIN IGE QN: 7 U/L — SIGNIFICANT CHANGE UP (ref 7–60)
LIDOCAIN IGE QN: 7 U/L — SIGNIFICANT CHANGE UP (ref 7–60)
LIDOCAIN IGE QN: 8 U/L — SIGNIFICANT CHANGE UP (ref 7–60)
LIDOCAIN IGE QN: 8 U/L — SIGNIFICANT CHANGE UP (ref 7–60)
MAGNESIUM SERPL-MCNC: 1.9 MG/DL — SIGNIFICANT CHANGE UP (ref 1.6–2.6)
MAGNESIUM SERPL-MCNC: 1.9 MG/DL — SIGNIFICANT CHANGE UP (ref 1.6–2.6)
MAGNESIUM SERPL-MCNC: 2.1 MG/DL — SIGNIFICANT CHANGE UP (ref 1.6–2.6)
MAGNESIUM SERPL-MCNC: 2.2 MG/DL — SIGNIFICANT CHANGE UP (ref 1.6–2.6)
MAGNESIUM SERPL-MCNC: 2.3 MG/DL — SIGNIFICANT CHANGE UP (ref 1.6–2.6)
MCHC RBC-ENTMCNC: 28.1 PG — SIGNIFICANT CHANGE UP (ref 27–34)
MCHC RBC-ENTMCNC: 28.2 PG — SIGNIFICANT CHANGE UP (ref 27–34)
MCHC RBC-ENTMCNC: 28.4 PG — SIGNIFICANT CHANGE UP (ref 27–34)
MCHC RBC-ENTMCNC: 28.7 PG — SIGNIFICANT CHANGE UP (ref 27–34)
MCHC RBC-ENTMCNC: 29.7 PG — SIGNIFICANT CHANGE UP (ref 27–34)
MCHC RBC-ENTMCNC: 31.6 GM/DL — LOW (ref 32–36)
MCHC RBC-ENTMCNC: 31.6 GM/DL — LOW (ref 32–36)
MCHC RBC-ENTMCNC: 31.9 GM/DL — LOW (ref 32–36)
MCHC RBC-ENTMCNC: 32.5 GM/DL — SIGNIFICANT CHANGE UP (ref 32–36)
MCHC RBC-ENTMCNC: 33.6 GM/DL — SIGNIFICANT CHANGE UP (ref 32–36)
MCV RBC AUTO: 87.9 FL — SIGNIFICANT CHANGE UP (ref 80–100)
MCV RBC AUTO: 88.3 FL — SIGNIFICANT CHANGE UP (ref 80–100)
MCV RBC AUTO: 88.3 FL — SIGNIFICANT CHANGE UP (ref 80–100)
MCV RBC AUTO: 89.4 FL — SIGNIFICANT CHANGE UP (ref 80–100)
MCV RBC AUTO: 89.8 FL — SIGNIFICANT CHANGE UP (ref 80–100)
NITRITE UR-MCNC: NEGATIVE — SIGNIFICANT CHANGE UP
PCO2 BLDA: 32 MMHG — SIGNIFICANT CHANGE UP (ref 32–46)
PCO2 BLDA: 32 MMHG — SIGNIFICANT CHANGE UP (ref 32–46)
PCO2 BLDA: 34 MMHG — SIGNIFICANT CHANGE UP (ref 32–46)
PCO2 BLDA: 35 MMHG — SIGNIFICANT CHANGE UP (ref 32–46)
PCO2 BLDA: 55 MMHG — HIGH (ref 32–46)
PCO2 BLDA: 63 MMHG — HIGH (ref 32–46)
PH BLDA: 7.21 — LOW (ref 7.35–7.45)
PH BLDA: 7.26 — LOW (ref 7.35–7.45)
PH BLDA: 7.42 — SIGNIFICANT CHANGE UP (ref 7.35–7.45)
PH BLDA: 7.42 — SIGNIFICANT CHANGE UP (ref 7.35–7.45)
PH BLDA: 7.44 — SIGNIFICANT CHANGE UP (ref 7.35–7.45)
PH BLDA: 7.46 — HIGH (ref 7.35–7.45)
PH UR: 5.5 — SIGNIFICANT CHANGE UP (ref 5–8)
PHOSPHATE SERPL-MCNC: 1.8 MG/DL — LOW (ref 2.5–4.5)
PHOSPHATE SERPL-MCNC: 1.8 MG/DL — LOW (ref 2.5–4.5)
PHOSPHATE SERPL-MCNC: 2.5 MG/DL — SIGNIFICANT CHANGE UP (ref 2.5–4.5)
PHOSPHATE SERPL-MCNC: 3.6 MG/DL — SIGNIFICANT CHANGE UP (ref 2.5–4.5)
PHOSPHATE SERPL-MCNC: 4.3 MG/DL — SIGNIFICANT CHANGE UP (ref 2.5–4.5)
PLATELET # BLD AUTO: 126 K/UL — LOW (ref 150–400)
PLATELET # BLD AUTO: 137 K/UL — LOW (ref 150–400)
PLATELET # BLD AUTO: 160 K/UL — SIGNIFICANT CHANGE UP (ref 150–400)
PLATELET # BLD AUTO: 177 K/UL — SIGNIFICANT CHANGE UP (ref 150–400)
PLATELET # BLD AUTO: 185 K/UL — SIGNIFICANT CHANGE UP (ref 150–400)
PO2 BLDA: 109 MMHG — HIGH (ref 74–108)
PO2 BLDA: 123 MMHG — HIGH (ref 74–108)
PO2 BLDA: 129 MMHG — HIGH (ref 74–108)
PO2 BLDA: 156 MMHG — HIGH (ref 74–108)
PO2 BLDA: 335 MMHG — HIGH (ref 74–108)
PO2 BLDA: 96 MMHG — SIGNIFICANT CHANGE UP (ref 74–108)
POTASSIUM SERPL-MCNC: 3.2 MMOL/L — LOW (ref 3.5–5.3)
POTASSIUM SERPL-MCNC: 3.2 MMOL/L — LOW (ref 3.5–5.3)
POTASSIUM SERPL-MCNC: 3.8 MMOL/L — SIGNIFICANT CHANGE UP (ref 3.5–5.3)
POTASSIUM SERPL-MCNC: 4.4 MMOL/L — SIGNIFICANT CHANGE UP (ref 3.5–5.3)
POTASSIUM SERPL-MCNC: 4.9 MMOL/L — SIGNIFICANT CHANGE UP (ref 3.5–5.3)
POTASSIUM SERPL-SCNC: 3.2 MMOL/L — LOW (ref 3.5–5.3)
POTASSIUM SERPL-SCNC: 3.2 MMOL/L — LOW (ref 3.5–5.3)
POTASSIUM SERPL-SCNC: 3.8 MMOL/L — SIGNIFICANT CHANGE UP (ref 3.5–5.3)
POTASSIUM SERPL-SCNC: 4.4 MMOL/L — SIGNIFICANT CHANGE UP (ref 3.5–5.3)
POTASSIUM SERPL-SCNC: 4.9 MMOL/L — SIGNIFICANT CHANGE UP (ref 3.5–5.3)
PROT SERPL-MCNC: 4.7 G/DL — LOW (ref 6–8.3)
PROT SERPL-MCNC: 4.7 G/DL — LOW (ref 6–8.3)
PROT SERPL-MCNC: 4.8 G/DL — LOW (ref 6–8.3)
PROT SERPL-MCNC: 5.1 G/DL — LOW (ref 6–8.3)
PROT SERPL-MCNC: 5.4 G/DL — LOW (ref 6–8.3)
PROT UR-MCNC: ABNORMAL
PROTHROM AB SERPL-ACNC: 14.3 SEC — HIGH (ref 10–12.9)
PROTHROM AB SERPL-ACNC: 15.3 SEC — HIGH (ref 10–12.9)
PROTHROM AB SERPL-ACNC: 16 SEC — HIGH (ref 10–12.9)
PROTHROM AB SERPL-ACNC: 16.3 SEC — HIGH (ref 10–12.9)
PROTHROM AB SERPL-ACNC: 16.7 SEC — HIGH (ref 10–12.9)
RBC # BLD: 3.06 M/UL — LOW (ref 3.8–5.2)
RBC # BLD: 3.08 M/UL — LOW (ref 3.8–5.2)
RBC # BLD: 3.27 M/UL — LOW (ref 3.8–5.2)
RBC # BLD: 3.3 M/UL — LOW (ref 3.8–5.2)
RBC # BLD: 3.62 M/UL — LOW (ref 3.8–5.2)
RBC # FLD: 13.1 % — SIGNIFICANT CHANGE UP (ref 10.3–14.5)
RBC # FLD: 13.3 % — SIGNIFICANT CHANGE UP (ref 10.3–14.5)
RBC # FLD: 13.6 % — SIGNIFICANT CHANGE UP (ref 10.3–14.5)
RBC # FLD: 14.1 % — SIGNIFICANT CHANGE UP (ref 10.3–14.5)
RBC # FLD: 14.4 % — SIGNIFICANT CHANGE UP (ref 10.3–14.5)
RBC CASTS # UR COMP ASSIST: 21 /HPF — HIGH (ref 0–4)
RH IG SCN BLD-IMP: POSITIVE — SIGNIFICANT CHANGE UP
SAO2 % BLDA: 100 % — HIGH (ref 92–96)
SAO2 % BLDA: 98 % — HIGH (ref 92–96)
SAO2 % BLDA: 99 % — HIGH (ref 92–96)
SODIUM SERPL-SCNC: 153 MMOL/L — HIGH (ref 135–145)
SODIUM SERPL-SCNC: 153 MMOL/L — HIGH (ref 135–145)
SODIUM SERPL-SCNC: 158 MMOL/L — HIGH (ref 135–145)
SODIUM SERPL-SCNC: 163 MMOL/L — CRITICAL HIGH (ref 135–145)
SODIUM SERPL-SCNC: 166 MMOL/L — CRITICAL HIGH (ref 135–145)
SP GR SPEC: 1.02 — SIGNIFICANT CHANGE UP (ref 1.01–1.02)
SP GR UR STRIP: 1 — LOW (ref 1.01–1.02)
TROPONIN T, HIGH SENSITIVITY RESULT: 117 NG/L — HIGH (ref 0–51)
TROPONIN T, HIGH SENSITIVITY RESULT: 120 NG/L — HIGH (ref 0–51)
TROPONIN T, HIGH SENSITIVITY RESULT: 137 NG/L — HIGH (ref 0–51)
TROPONIN T, HIGH SENSITIVITY RESULT: 155 NG/L — HIGH (ref 0–51)
TROPONIN T, HIGH SENSITIVITY RESULT: 156 NG/L — HIGH (ref 0–51)
UROBILINOGEN FLD QL: NEGATIVE — SIGNIFICANT CHANGE UP
WBC # BLD: 10.9 K/UL — HIGH (ref 3.8–10.5)
WBC # BLD: 12.3 K/UL — HIGH (ref 3.8–10.5)
WBC # BLD: 13.4 K/UL — HIGH (ref 3.8–10.5)
WBC # BLD: 16.9 K/UL — HIGH (ref 3.8–10.5)
WBC # BLD: 19.7 K/UL — HIGH (ref 3.8–10.5)
WBC # FLD AUTO: 10.9 K/UL — HIGH (ref 3.8–10.5)
WBC # FLD AUTO: 12.3 K/UL — HIGH (ref 3.8–10.5)
WBC # FLD AUTO: 13.4 K/UL — HIGH (ref 3.8–10.5)
WBC # FLD AUTO: 16.9 K/UL — HIGH (ref 3.8–10.5)
WBC # FLD AUTO: 19.7 K/UL — HIGH (ref 3.8–10.5)
WBC UR QL: 5 /HPF — SIGNIFICANT CHANGE UP (ref 0–5)

## 2019-04-06 PROCEDURE — 99291 CRITICAL CARE FIRST HOUR: CPT

## 2019-04-06 PROCEDURE — 47000 NEEDLE BIOPSY OF LIVER PERQ: CPT

## 2019-04-06 PROCEDURE — 74150 CT ABDOMEN W/O CONTRAST: CPT | Mod: 26

## 2019-04-06 PROCEDURE — 71045 X-RAY EXAM CHEST 1 VIEW: CPT | Mod: 26

## 2019-04-06 RX ORDER — PIPERACILLIN AND TAZOBACTAM 4; .5 G/20ML; G/20ML
3.38 INJECTION, POWDER, LYOPHILIZED, FOR SOLUTION INTRAVENOUS EVERY 12 HOURS
Qty: 0 | Refills: 0 | Status: DISCONTINUED | OUTPATIENT
Start: 2019-04-06 | End: 2019-04-06

## 2019-04-06 RX ORDER — PHENYLEPHRINE HYDROCHLORIDE 10 MG/ML
5 INJECTION INTRAVENOUS
Qty: 40 | Refills: 0 | Status: DISCONTINUED | OUTPATIENT
Start: 2019-04-06 | End: 2019-04-07

## 2019-04-06 RX ORDER — SODIUM CHLORIDE 9 MG/ML
1000 INJECTION, SOLUTION INTRAVENOUS
Qty: 0 | Refills: 0 | Status: DISCONTINUED | OUTPATIENT
Start: 2019-04-06 | End: 2019-04-07

## 2019-04-06 RX ORDER — HYDRALAZINE HCL 50 MG
5 TABLET ORAL ONCE
Qty: 0 | Refills: 0 | Status: DISCONTINUED | OUTPATIENT
Start: 2019-04-06 | End: 2019-04-06

## 2019-04-06 RX ORDER — LEVOTHYROXINE SODIUM 125 MCG
20 TABLET ORAL ONCE
Qty: 0 | Refills: 0 | Status: COMPLETED | OUTPATIENT
Start: 2019-04-06 | End: 2019-04-06

## 2019-04-06 RX ORDER — SODIUM CHLORIDE 9 MG/ML
1000 INJECTION, SOLUTION INTRAVENOUS
Qty: 0 | Refills: 0 | Status: DISCONTINUED | OUTPATIENT
Start: 2019-04-06 | End: 2019-04-06

## 2019-04-06 RX ORDER — POTASSIUM CHLORIDE 20 MEQ
40 PACKET (EA) ORAL
Qty: 0 | Refills: 0 | Status: COMPLETED | OUTPATIENT
Start: 2019-04-06 | End: 2019-04-06

## 2019-04-06 RX ORDER — SODIUM CHLORIDE 9 MG/ML
1000 INJECTION INTRAMUSCULAR; INTRAVENOUS; SUBCUTANEOUS ONCE
Qty: 0 | Refills: 0 | Status: COMPLETED | OUTPATIENT
Start: 2019-04-06 | End: 2019-04-06

## 2019-04-06 RX ORDER — DESMOPRESSIN ACETATE 0.1 MG/1
1 TABLET ORAL ONCE
Qty: 0 | Refills: 0 | Status: COMPLETED | OUTPATIENT
Start: 2019-04-06 | End: 2019-04-06

## 2019-04-06 RX ORDER — PIPERACILLIN AND TAZOBACTAM 4; .5 G/20ML; G/20ML
3.38 INJECTION, POWDER, LYOPHILIZED, FOR SOLUTION INTRAVENOUS EVERY 6 HOURS
Qty: 0 | Refills: 0 | Status: DISCONTINUED | OUTPATIENT
Start: 2019-04-06 | End: 2019-04-07

## 2019-04-06 RX ORDER — VASOPRESSIN 20 [USP'U]/ML
0.5 INJECTION INTRAVENOUS
Qty: 50 | Refills: 0 | Status: DISCONTINUED | OUTPATIENT
Start: 2019-04-06 | End: 2019-04-06

## 2019-04-06 RX ORDER — VASOPRESSIN 20 [USP'U]/ML
0.1 INJECTION INTRAVENOUS
Qty: 50 | Refills: 0 | Status: DISCONTINUED | OUTPATIENT
Start: 2019-04-06 | End: 2019-04-07

## 2019-04-06 RX ORDER — POTASSIUM CHLORIDE 20 MEQ
20 PACKET (EA) ORAL ONCE
Qty: 0 | Refills: 0 | Status: COMPLETED | OUTPATIENT
Start: 2019-04-06 | End: 2019-04-06

## 2019-04-06 RX ORDER — LEVOTHYROXINE SODIUM 125 MCG
10 TABLET ORAL
Qty: 200 | Refills: 0 | Status: DISCONTINUED | OUTPATIENT
Start: 2019-04-06 | End: 2019-04-07

## 2019-04-06 RX ORDER — POTASSIUM CHLORIDE 20 MEQ
20 PACKET (EA) ORAL ONCE
Qty: 0 | Refills: 0 | Status: DISCONTINUED | OUTPATIENT
Start: 2019-04-06 | End: 2019-04-06

## 2019-04-06 RX ORDER — POTASSIUM PHOSPHATE, MONOBASIC POTASSIUM PHOSPHATE, DIBASIC 236; 224 MG/ML; MG/ML
30 INJECTION, SOLUTION INTRAVENOUS ONCE
Qty: 0 | Refills: 0 | Status: COMPLETED | OUTPATIENT
Start: 2019-04-06 | End: 2019-04-06

## 2019-04-06 RX ORDER — VASOPRESSIN 20 [USP'U]/ML
1 INJECTION INTRAVENOUS ONCE
Qty: 0 | Refills: 0 | Status: COMPLETED | OUTPATIENT
Start: 2019-04-06 | End: 2019-04-06

## 2019-04-06 RX ORDER — POTASSIUM CHLORIDE 20 MEQ
40 PACKET (EA) ORAL
Qty: 0 | Refills: 0 | Status: DISCONTINUED | OUTPATIENT
Start: 2019-04-06 | End: 2019-04-06

## 2019-04-06 RX ADMIN — PANTOPRAZOLE SODIUM 40 MILLIGRAM(S): 20 TABLET, DELAYED RELEASE ORAL at 17:15

## 2019-04-06 RX ADMIN — SODIUM CHLORIDE 100 MILLILITER(S): 9 INJECTION, SOLUTION INTRAVENOUS at 20:24

## 2019-04-06 RX ADMIN — Medication 40 MILLIEQUIVALENT(S): at 16:50

## 2019-04-06 RX ADMIN — Medication 81 MILLIGRAM(S): at 13:20

## 2019-04-06 RX ADMIN — PIPERACILLIN AND TAZOBACTAM 200 GRAM(S): 4; .5 INJECTION, POWDER, LYOPHILIZED, FOR SOLUTION INTRAVENOUS at 18:51

## 2019-04-06 RX ADMIN — Medication 40 MILLIEQUIVALENT(S): at 13:20

## 2019-04-06 RX ADMIN — AMIODARONE HYDROCHLORIDE 200 MILLIGRAM(S): 400 TABLET ORAL at 05:11

## 2019-04-06 RX ADMIN — SODIUM CHLORIDE 60 MILLILITER(S): 9 INJECTION INTRAMUSCULAR; INTRAVENOUS; SUBCUTANEOUS at 04:50

## 2019-04-06 RX ADMIN — Medication 25 MICROGRAM(S)/HR: at 20:25

## 2019-04-06 RX ADMIN — POTASSIUM PHOSPHATE, MONOBASIC POTASSIUM PHOSPHATE, DIBASIC 83.33 MILLIMOLE(S): 236; 224 INJECTION, SOLUTION INTRAVENOUS at 14:27

## 2019-04-06 RX ADMIN — VASOPRESSIN 1 UNIT(S): 20 INJECTION INTRAVENOUS at 18:51

## 2019-04-06 RX ADMIN — Medication 2: at 23:32

## 2019-04-06 RX ADMIN — Medication 100 MILLIGRAM(S): at 13:26

## 2019-04-06 RX ADMIN — Medication 100 MILLIGRAM(S): at 05:11

## 2019-04-06 RX ADMIN — Medication 20 MICROGRAM(S): at 20:31

## 2019-04-06 RX ADMIN — Medication 2: at 12:07

## 2019-04-06 RX ADMIN — PIPERACILLIN AND TAZOBACTAM 200 GRAM(S): 4; .5 INJECTION, POWDER, LYOPHILIZED, FOR SOLUTION INTRAVENOUS at 23:23

## 2019-04-06 RX ADMIN — DESMOPRESSIN ACETATE 1 MICROGRAM(S): 0.1 TABLET ORAL at 01:11

## 2019-04-06 RX ADMIN — CHLORHEXIDINE GLUCONATE 1 APPLICATION(S): 213 SOLUTION TOPICAL at 21:30

## 2019-04-06 RX ADMIN — Medication 2 DROP(S): at 20:24

## 2019-04-06 RX ADMIN — Medication 2: at 05:37

## 2019-04-06 RX ADMIN — SODIUM CHLORIDE 100 MILLILITER(S): 9 INJECTION, SOLUTION INTRAVENOUS at 01:11

## 2019-04-06 RX ADMIN — VASOPRESSIN 0.5 UNIT(S)/HR: 20 INJECTION INTRAVENOUS at 20:25

## 2019-04-06 RX ADMIN — CHLORHEXIDINE GLUCONATE 15 MILLILITER(S): 213 SOLUTION TOPICAL at 17:15

## 2019-04-06 RX ADMIN — Medication 2: at 17:28

## 2019-04-06 RX ADMIN — Medication 20 MILLIEQUIVALENT(S): at 01:06

## 2019-04-06 RX ADMIN — ENOXAPARIN SODIUM 40 MILLIGRAM(S): 100 INJECTION SUBCUTANEOUS at 17:15

## 2019-04-06 RX ADMIN — Medication 113.2 MILLIGRAM(S): at 20:24

## 2019-04-06 RX ADMIN — PANTOPRAZOLE SODIUM 40 MILLIGRAM(S): 20 TABLET, DELAYED RELEASE ORAL at 05:11

## 2019-04-06 RX ADMIN — CHLORHEXIDINE GLUCONATE 15 MILLILITER(S): 213 SOLUTION TOPICAL at 05:10

## 2019-04-06 NOTE — PROVIDER CONTACT NOTE (CRITICAL VALUE NOTIFICATION) - ACTION/TREATMENT ORDERED:
Provider and primary RN aware. As per provider, continue free water as ordered. Primary RN notified.
D5@ 100cc/hr ordered. DDAVP ordered. Free water 6qh, first dose due now. Continue BMP, q6h, as per ABDULKADIR Hayes. Will continue to monitor.
repeat cardiac enzymes 8 hours from when it was last drawn; continue to monitor

## 2019-04-06 NOTE — PROGRESS NOTE ADULT - SUBJECTIVE AND OBJECTIVE BOX
SUMMARY: 85yo woman transferred from [facility] where she was found to have R IC/MCA occlusion, outside time window for tPA, taken to angio, unsuccessful recanalization. Intraop, unable to access through R groin due to tortuous vessels, carotid artery accessed, c/b neck hematoma, which stabilized with manual compression. PMH HTN/HLD, LWK around 11am on 4/2, per  at bedside, patient drove home, where she didn't park her car right and was struggling to open the front door, when he saw her "not right" and with facial droop. BIBA to [facility] ED.     ON ADMISSION: NIHSS 18.     HOSPITAL COURSE:  4/3: -  Immediately post angio around 9pm on patient found to have R 5mm NR pupil, L 3mm R, no movements on all four extremities on propofol, which was stopped, became hypotensive, cardene d/c'ed, was on syed, given mannitol 60mg IV, unable to get IV access, emergent L fem TLC placed, taken to CTH, which showed small R contrast extravasation vs. heme but otherwise no significant edema/MLS.   - Also, noted to have multiple runs of vtachy, and hypotensive, cardene held, amio 150mg push given.  4/4: Neurological exam worsened at night. Concern for uncal herniation. No neurosurgical plans.  4/5:     Vitals/labs/meds/imaging reviewed  intubated  Eye opening apraxia.  b/l pupils - NR. No corneals  + cough/gag. No oculocephalic reflex.  b/l UE - flexion   b/l LE - TF  Irregular rate, 3/6 blowing systolic murmur RUSB  clear lungs  soft abd/nd  Extremities - No edema  R neck hematoma soft, no expansion SUMMARY: 87yo woman transferred from [facility] where she was found to have R IC/MCA occlusion, outside time window for tPA, taken to angio, unsuccessful recanalization. Intraop, unable to access through R groin due to tortuous vessels, carotid artery accessed, c/b neck hematoma, which stabilized with manual compression. PMH HTN/HLD, LWK around 11am on 4/2, per  at bedside, patient drove home, where she didn't park her car right and was struggling to open the front door, when he saw her "not right" and with facial droop. BIBA to [facility] ED.     ON ADMISSION: NIHSS 18.     HOSPITAL COURSE:  4/3: -  Immediately post angio around 9pm on patient found to have R 5mm NR pupil, L 3mm R, no movements on all four extremities on propofol, which was stopped, became hypotensive, cardene d/c'ed, was on syed, given mannitol 60mg IV, unable to get IV access, emergent L fem TLC placed, taken to CTH, which showed small R contrast extravasation vs. heme but otherwise no significant edema/MLS.   - Also, noted to have multiple runs of vtachy, and hypotensive, cardene held, amio 150mg push given.  4/4: Neurological exam worsened at night. Concern for uncal herniation. No neurosurgical plans.  4/5: Family aware of patient prognosis. Accepting of outcome. Live-on approached family - awaiting progression of patient's neurological status.    OVERNIGHT EVENTS: Still has cough/gag. IR previously called for liver biopsy. However, will await till patient has not progressed to brain death yet.     Vitals/labs/meds/imaging reviewed  intubated  b/l pupils - NR. No corneals  + cough/gag. No oculocephalic reflex.  b/l UE - flexion   b/l LE - TF  Irregular rate, 3/6 blowing systolic murmur RUSB  clear lungs  soft abd/nd  Extremities - No edema  R neck hematoma soft, no expansion

## 2019-04-06 NOTE — DISCHARGE NOTE FOR THE EXPIRED PATIENT - HOSPITAL COURSE
85yo woman transferred from [facility] on 4/2/19 where she was found to have R IC/MCA occlusion, outside time window for tPA, taken to angio, unsuccessful recanalization. Intraop, unable to access through R groin due to tortuous vessels, carotid artery accessed, c/b neck hematoma, which stabilized with manual SUMMARY: 85yo woman transferred from [facility] where she was found to have R IC/MCA occlusion, outside time window for tPA, taken to angio, unsuccessful recanalization. Intraop, unable to access through R groin due to tortuous vessels, carotid artery accessed, c/b neck hematoma, which stabilized with manual compression. PMH HTN/HLD, LWK around 11am on 4/2, per  at bedside, patient drove home, where she didn't park her car right and was struggling to open the front door, when he saw her "not right" and with facial droop. BIBA to [facility] ED.     ON ADMISSION: NIHSS 18.     HOSPITAL COURSE:  4/3: -  Immediately post angio around 9pm on patient found to have R 5mm NR pupil, L 3mm R, no movements on all four extremities on propofol, which was stopped, became hypotensive, cardene d/c'ed, was on syed, given mannitol 60mg IV, unable to get IV access, emergent L fem TLC placed, taken to CTH, which showed small R contrast extravasation vs. heme but otherwise no significant edema/MLS.   - Also, noted to have multiple runs of vtachy, and hypotensive, cardene held, amio 150mg push given.  4/5 Head Ct:  from: CT Head No Cont   There has been further evolution of the acute right middle and anterior   cerebral artery infarcts with further sulcal effacement and slight mass   effect on the right lateral ventricle. There is significantly more   midline shift to the left with dilatation of the left lateral ventricle   consistent with transtentorial and subfalcine herniation. Effacement of   the basal cisterns is noted.  Patient condition was discussed with  at bedside, at risk for deterioration and death due to acute stroke, cytotoxic edema, ventricular arrhythmia, cardiac arrest . He agreed to DNR, but full medical management at this time. Will call his children and patient's friends to inform them.   Family agreed to organ donation on 4/5/19. Patient's brain death examination was performed on 4/6/19   Upon examination, patient's R eye was 4mm and NR. L eye was 5mm and NR.  No corneals b/l. No cough/gag.   No spontaneous breathing noted.   No occulocephalic reflex  No deviation of eyes to cold caloric testing.   No supraspinal motor response to pain noted.   Apnea testing performed, and patient was pronounced brain dead on 4/6/19 @ 16:16

## 2019-04-06 NOTE — PROVIDER CONTACT NOTE (CRITICAL VALUE NOTIFICATION) - RECOMMENDATIONS
Notify primary RN. Notify provider.
DDAVP, Free Water, repeat BMP q6h
repeat cardiac enzymes 8 hours from when it was last drawn; continue to monitor

## 2019-04-06 NOTE — PROGRESS NOTE ADULT - SUBJECTIVE AND OBJECTIVE BOX
Patient's brain death examination was performed this afternoon.     Upon examination, patient's R eye was 4mm and NR. L eye was 5mm and NR.  No corneals b/l. No cough/gag.   No spontaneous breathing noted.   No occulocephalic reflex  No deviation of eyes to cold caloric testing.   No supraspinal motor response to pain noted.   Apnea testing performed, and patient was pronounced brain dead on 4/6/19 @ 16:16

## 2019-04-06 NOTE — PROVIDER CONTACT NOTE (CRITICAL VALUE NOTIFICATION) - ASSESSMENT
No movement on painful stimulus on upper extremities. Triple flexion bilaterally on lower extremities. Left pupil 5mm, round fixed. Right pupil 4mm, round, fixed. HR 53. /48. Temp 37.2 via rectal probe. RR 16.

## 2019-04-06 NOTE — PROGRESS NOTE ADULT - ASSESSMENT
R IC/MCA occlusion on CTA, no tPA, unsuccessful revascularization; NIHSS 18  Possibly cardioembolic  Concern for uncal herniation; no neurosurgical intervention offered.   Will continue goals of care conversation.     Neuro:  May progress to brain death   stroke core measures: A1C, LDL  On ASA 81 and atorvastatin to 80 qHS  monitor neck hematoma  NSGY not offering surgical intervention  Palliative consult; GOC discussion  bedrest    Card:  Torsades - On amio 200 daily (per Cardio)  Keep K > 4, Mg > 2, Phos > 3  likely sev AS - TTE  -160  hydralazine PRN    Pulm:  intubated  Peridex  GOC discussion    GI:  NPO   Bowel regimen  Gi ppx: protonix 40 iv daily    Renal: Na goal 145-155  On LR   JULIA noted    Endo:  maintain euglycemia  A1C 5.8    Heme/onc:  SCDs, on Lovenox 40 qHS    ID: Blood cultures - NGTD  Urine cultures - > 3 organisms; ?contaminant    DNR  ICU    at risk for deterioration and death due to acute stroke, cytotoxic edema, ventricular arrhythmia, cardiac arrest  critical care time 35min R IC/MCA occlusion on CTA, no tPA, unsuccessful revascularization; NIHSS 18  Possibly cardioembolic  Concern for uncal herniation; no neurosurgical intervention offered.   Concern for progression to brain death; family agreeable to organ donation.     Neuro:  Concern for worsening neurological status  On ASA 81 and atorvastatin to 80 qHS  monitor neck hematoma  NSGY not offering surgical intervention  bedrest    Card:  Torsades - On amio 200 daily (per Cardio)  Keep K > 4, Mg > 2, Phos > 3  likely sev AS - TTE  -160  hydralazine PRN    Pulm:  intubated  Peridex    GI:  NPO   Bowel regimen  Gi ppx: protonix 40 iv daily    Renal: Na goal 145-155  On LR   JULIA noted    Endo:  maintain euglycemia  A1C 5.8    Heme/onc:  SCDs, on Lovenox 40 qHS    ID: Blood cultures - NGTD  Urine cultures - > 3 organisms; ?contaminant    DNR  ICU    at risk for deterioration and death due to acute stroke, cytotoxic edema, ventricular arrhythmia, cardiac arrest  critical care time 35min

## 2019-04-07 VITALS — HEART RATE: 76 BPM | RESPIRATION RATE: 16 BRPM | OXYGEN SATURATION: 98 %

## 2019-04-07 LAB
ALBUMIN SERPL ELPH-MCNC: 2.2 G/DL — LOW (ref 3.3–5)
ALBUMIN SERPL ELPH-MCNC: 2.4 G/DL — LOW (ref 3.3–5)
ALP SERPL-CCNC: 91 U/L — SIGNIFICANT CHANGE UP (ref 40–120)
ALP SERPL-CCNC: 99 U/L — SIGNIFICANT CHANGE UP (ref 40–120)
ALT FLD-CCNC: 11 U/L — SIGNIFICANT CHANGE UP (ref 10–45)
ALT FLD-CCNC: 12 U/L — SIGNIFICANT CHANGE UP (ref 10–45)
AMYLASE P1 CFR SERPL: 20 U/L — LOW (ref 25–125)
ANION GAP SERPL CALC-SCNC: 11 MMOL/L — SIGNIFICANT CHANGE UP (ref 5–17)
ANION GAP SERPL CALC-SCNC: 12 MMOL/L — SIGNIFICANT CHANGE UP (ref 5–17)
APTT BLD: 30 SEC — SIGNIFICANT CHANGE UP (ref 27.5–36.3)
APTT BLD: 31.4 SEC — SIGNIFICANT CHANGE UP (ref 27.5–36.3)
AST SERPL-CCNC: 16 U/L — SIGNIFICANT CHANGE UP (ref 10–40)
AST SERPL-CCNC: 18 U/L — SIGNIFICANT CHANGE UP (ref 10–40)
BASE EXCESS BLDA CALC-SCNC: -4.3 MMOL/L — LOW (ref -2–2)
BASE EXCESS BLDA CALC-SCNC: -5 MMOL/L — LOW (ref -2–2)
BILIRUB DIRECT SERPL-MCNC: 0.2 MG/DL — SIGNIFICANT CHANGE UP (ref 0–0.2)
BILIRUB DIRECT SERPL-MCNC: 0.3 MG/DL — HIGH (ref 0–0.2)
BILIRUB INDIRECT FLD-MCNC: 0.3 MG/DL — SIGNIFICANT CHANGE UP (ref 0.2–1)
BILIRUB INDIRECT FLD-MCNC: 0.4 MG/DL — SIGNIFICANT CHANGE UP (ref 0.2–1)
BILIRUB SERPL-MCNC: 0.6 MG/DL — SIGNIFICANT CHANGE UP (ref 0.2–1.2)
BILIRUB SERPL-MCNC: 0.6 MG/DL — SIGNIFICANT CHANGE UP (ref 0.2–1.2)
BUN SERPL-MCNC: 18 MG/DL — SIGNIFICANT CHANGE UP (ref 7–23)
BUN SERPL-MCNC: 18 MG/DL — SIGNIFICANT CHANGE UP (ref 7–23)
CALCIUM SERPL-MCNC: 7.8 MG/DL — LOW (ref 8.4–10.5)
CALCIUM SERPL-MCNC: 8.1 MG/DL — LOW (ref 8.4–10.5)
CHLORIDE SERPL-SCNC: 119 MMOL/L — HIGH (ref 96–108)
CHLORIDE SERPL-SCNC: 121 MMOL/L — HIGH (ref 96–108)
CK MB CFR SERPL CALC: 1.9 NG/ML — SIGNIFICANT CHANGE UP (ref 0–3.8)
CK MB CFR SERPL CALC: 2.1 NG/ML — SIGNIFICANT CHANGE UP (ref 0–3.8)
CK SERPL-CCNC: 33 U/L — SIGNIFICANT CHANGE UP (ref 25–170)
CK SERPL-CCNC: 35 U/L — SIGNIFICANT CHANGE UP (ref 25–170)
CO2 BLDA-SCNC: 20 MMOL/L — LOW (ref 22–30)
CO2 BLDA-SCNC: 20 MMOL/L — LOW (ref 22–30)
CO2 SERPL-SCNC: 16 MMOL/L — LOW (ref 22–31)
CO2 SERPL-SCNC: 18 MMOL/L — LOW (ref 22–31)
CREAT SERPL-MCNC: 1.02 MG/DL — SIGNIFICANT CHANGE UP (ref 0.5–1.3)
CREAT SERPL-MCNC: 1.13 MG/DL — SIGNIFICANT CHANGE UP (ref 0.5–1.3)
GAS PNL BLDA: SIGNIFICANT CHANGE UP
GAS PNL BLDA: SIGNIFICANT CHANGE UP
GLUCOSE SERPL-MCNC: 221 MG/DL — HIGH (ref 70–99)
GLUCOSE SERPL-MCNC: 222 MG/DL — HIGH (ref 70–99)
HCO3 BLDA-SCNC: 19 MMOL/L — LOW (ref 21–29)
HCO3 BLDA-SCNC: 19 MMOL/L — LOW (ref 21–29)
HCT VFR BLD CALC: 28.5 % — LOW (ref 34.5–45)
HCT VFR BLD CALC: 32.1 % — LOW (ref 34.5–45)
HGB BLD-MCNC: 10 G/DL — LOW (ref 11.5–15.5)
HGB BLD-MCNC: 9.2 G/DL — LOW (ref 11.5–15.5)
HOROWITZ INDEX BLDA+IHG-RTO: 30 — SIGNIFICANT CHANGE UP
HOROWITZ INDEX BLDA+IHG-RTO: 30 — SIGNIFICANT CHANGE UP
INR BLD: 1.22 RATIO — HIGH (ref 0.88–1.16)
INR BLD: 1.3 RATIO — HIGH (ref 0.88–1.16)
LIDOCAIN IGE QN: 6 U/L — LOW (ref 7–60)
LIDOCAIN IGE QN: 6 U/L — LOW (ref 7–60)
MAGNESIUM SERPL-MCNC: 1.8 MG/DL — SIGNIFICANT CHANGE UP (ref 1.6–2.6)
MAGNESIUM SERPL-MCNC: 1.8 MG/DL — SIGNIFICANT CHANGE UP (ref 1.6–2.6)
MCHC RBC-ENTMCNC: 27.9 PG — SIGNIFICANT CHANGE UP (ref 27–34)
MCHC RBC-ENTMCNC: 28.9 PG — SIGNIFICANT CHANGE UP (ref 27–34)
MCHC RBC-ENTMCNC: 31.3 GM/DL — LOW (ref 32–36)
MCHC RBC-ENTMCNC: 32.3 GM/DL — SIGNIFICANT CHANGE UP (ref 32–36)
MCV RBC AUTO: 89.2 FL — SIGNIFICANT CHANGE UP (ref 80–100)
MCV RBC AUTO: 89.3 FL — SIGNIFICANT CHANGE UP (ref 80–100)
PCO2 BLDA: 32 MMHG — SIGNIFICANT CHANGE UP (ref 32–46)
PCO2 BLDA: 34 MMHG — SIGNIFICANT CHANGE UP (ref 32–46)
PH BLDA: 7.36 — SIGNIFICANT CHANGE UP (ref 7.35–7.45)
PH BLDA: 7.4 — SIGNIFICANT CHANGE UP (ref 7.35–7.45)
PHOSPHATE SERPL-MCNC: 3 MG/DL — SIGNIFICANT CHANGE UP (ref 2.5–4.5)
PHOSPHATE SERPL-MCNC: 3.2 MG/DL — SIGNIFICANT CHANGE UP (ref 2.5–4.5)
PLATELET # BLD AUTO: 124 K/UL — LOW (ref 150–400)
PLATELET # BLD AUTO: 161 K/UL — SIGNIFICANT CHANGE UP (ref 150–400)
PO2 BLDA: 110 MMHG — HIGH (ref 74–108)
PO2 BLDA: 132 MMHG — HIGH (ref 74–108)
POTASSIUM SERPL-MCNC: 4.2 MMOL/L — SIGNIFICANT CHANGE UP (ref 3.5–5.3)
POTASSIUM SERPL-MCNC: 4.3 MMOL/L — SIGNIFICANT CHANGE UP (ref 3.5–5.3)
POTASSIUM SERPL-SCNC: 4.2 MMOL/L — SIGNIFICANT CHANGE UP (ref 3.5–5.3)
POTASSIUM SERPL-SCNC: 4.3 MMOL/L — SIGNIFICANT CHANGE UP (ref 3.5–5.3)
PROT SERPL-MCNC: 4.8 G/DL — LOW (ref 6–8.3)
PROT SERPL-MCNC: 5.2 G/DL — LOW (ref 6–8.3)
PROTHROM AB SERPL-ACNC: 14 SEC — HIGH (ref 10–12.9)
PROTHROM AB SERPL-ACNC: 15.1 SEC — HIGH (ref 10–12.9)
RBC # BLD: 3.19 M/UL — LOW (ref 3.8–5.2)
RBC # BLD: 3.6 M/UL — LOW (ref 3.8–5.2)
RBC # FLD: 14.2 % — SIGNIFICANT CHANGE UP (ref 10.3–14.5)
RBC # FLD: 14.3 % — SIGNIFICANT CHANGE UP (ref 10.3–14.5)
SAO2 % BLDA: 98 % — HIGH (ref 92–96)
SAO2 % BLDA: 99 % — HIGH (ref 92–96)
SODIUM SERPL-SCNC: 146 MMOL/L — HIGH (ref 135–145)
SODIUM SERPL-SCNC: 151 MMOL/L — HIGH (ref 135–145)
TROPONIN T, HIGH SENSITIVITY RESULT: 104 NG/L — HIGH (ref 0–51)
WBC # BLD: 11.4 K/UL — HIGH (ref 3.8–10.5)
WBC # BLD: 18.1 K/UL — HIGH (ref 3.8–10.5)
WBC # FLD AUTO: 11.4 K/UL — HIGH (ref 3.8–10.5)
WBC # FLD AUTO: 18.1 K/UL — HIGH (ref 3.8–10.5)

## 2019-04-07 PROCEDURE — 82565 ASSAY OF CREATININE: CPT

## 2019-04-07 PROCEDURE — C1894: CPT

## 2019-04-07 PROCEDURE — 83690 ASSAY OF LIPASE: CPT

## 2019-04-07 PROCEDURE — 94002 VENT MGMT INPAT INIT DAY: CPT

## 2019-04-07 PROCEDURE — P9016: CPT

## 2019-04-07 PROCEDURE — 84295 ASSAY OF SERUM SODIUM: CPT

## 2019-04-07 PROCEDURE — 84436 ASSAY OF TOTAL THYROXINE: CPT

## 2019-04-07 PROCEDURE — 84100 ASSAY OF PHOSPHORUS: CPT

## 2019-04-07 PROCEDURE — 84132 ASSAY OF SERUM POTASSIUM: CPT

## 2019-04-07 PROCEDURE — 36223 PLACE CATH CAROTID/INOM ART: CPT | Mod: XP,XU,XS

## 2019-04-07 PROCEDURE — 36015 PLACE CATHETER IN ARTERY: CPT

## 2019-04-07 PROCEDURE — 70450 CT HEAD/BRAIN W/O DYE: CPT

## 2019-04-07 PROCEDURE — 83930 ASSAY OF BLOOD OSMOLALITY: CPT

## 2019-04-07 PROCEDURE — 85730 THROMBOPLASTIN TIME PARTIAL: CPT

## 2019-04-07 PROCEDURE — 85610 PROTHROMBIN TIME: CPT

## 2019-04-07 PROCEDURE — 81001 URINALYSIS AUTO W/SCOPE: CPT

## 2019-04-07 PROCEDURE — 93970 EXTREMITY STUDY: CPT

## 2019-04-07 PROCEDURE — C1757: CPT

## 2019-04-07 PROCEDURE — 82550 ASSAY OF CK (CPK): CPT

## 2019-04-07 PROCEDURE — 80076 HEPATIC FUNCTION PANEL: CPT

## 2019-04-07 PROCEDURE — 93882 EXTRACRANIAL UNI/LTD STUDY: CPT

## 2019-04-07 PROCEDURE — 80048 BASIC METABOLIC PNL TOTAL CA: CPT

## 2019-04-07 PROCEDURE — 71045 X-RAY EXAM CHEST 1 VIEW: CPT | Mod: 26

## 2019-04-07 PROCEDURE — 87040 BLOOD CULTURE FOR BACTERIA: CPT

## 2019-04-07 PROCEDURE — 87086 URINE CULTURE/COLONY COUNT: CPT

## 2019-04-07 PROCEDURE — 86900 BLOOD TYPING SEROLOGIC ABO: CPT

## 2019-04-07 PROCEDURE — 85014 HEMATOCRIT: CPT

## 2019-04-07 PROCEDURE — 94003 VENT MGMT INPAT SUBQ DAY: CPT

## 2019-04-07 PROCEDURE — C1773: CPT

## 2019-04-07 PROCEDURE — 36600 WITHDRAWAL OF ARTERIAL BLOOD: CPT

## 2019-04-07 PROCEDURE — 82962 GLUCOSE BLOOD TEST: CPT

## 2019-04-07 PROCEDURE — 83735 ASSAY OF MAGNESIUM: CPT

## 2019-04-07 PROCEDURE — 74150 CT ABDOMEN W/O CONTRAST: CPT

## 2019-04-07 PROCEDURE — 86901 BLOOD TYPING SEROLOGIC RH(D): CPT

## 2019-04-07 PROCEDURE — 93005 ELECTROCARDIOGRAM TRACING: CPT

## 2019-04-07 PROCEDURE — 82435 ASSAY OF BLOOD CHLORIDE: CPT

## 2019-04-07 PROCEDURE — 86923 COMPATIBILITY TEST ELECTRIC: CPT

## 2019-04-07 PROCEDURE — 97166 OT EVAL MOD COMPLEX 45 MIN: CPT

## 2019-04-07 PROCEDURE — 82150 ASSAY OF AMYLASE: CPT

## 2019-04-07 PROCEDURE — 82947 ASSAY GLUCOSE BLOOD QUANT: CPT

## 2019-04-07 PROCEDURE — 82803 BLOOD GASES ANY COMBINATION: CPT

## 2019-04-07 PROCEDURE — 0042T: CPT

## 2019-04-07 PROCEDURE — 84443 ASSAY THYROID STIM HORMONE: CPT

## 2019-04-07 PROCEDURE — C1887: CPT

## 2019-04-07 PROCEDURE — 86850 RBC ANTIBODY SCREEN: CPT

## 2019-04-07 PROCEDURE — 84480 ASSAY TRIIODOTHYRONINE (T3): CPT

## 2019-04-07 PROCEDURE — C1769: CPT

## 2019-04-07 PROCEDURE — 82553 CREATINE MB FRACTION: CPT

## 2019-04-07 PROCEDURE — 81005 URINALYSIS: CPT

## 2019-04-07 PROCEDURE — 36430 TRANSFUSION BLD/BLD COMPNT: CPT

## 2019-04-07 PROCEDURE — 83605 ASSAY OF LACTIC ACID: CPT

## 2019-04-07 PROCEDURE — 82330 ASSAY OF CALCIUM: CPT

## 2019-04-07 PROCEDURE — 71045 X-RAY EXAM CHEST 1 VIEW: CPT

## 2019-04-07 PROCEDURE — 80053 COMPREHEN METABOLIC PANEL: CPT

## 2019-04-07 PROCEDURE — 47000 NEEDLE BIOPSY OF LIVER PERQ: CPT

## 2019-04-07 PROCEDURE — 93926 LOWER EXTREMITY STUDY: CPT

## 2019-04-07 PROCEDURE — 99291 CRITICAL CARE FIRST HOUR: CPT | Mod: 25

## 2019-04-07 PROCEDURE — 80061 LIPID PANEL: CPT

## 2019-04-07 PROCEDURE — 61645 PERQ ART M-THROMBECT &/NFS: CPT

## 2019-04-07 PROCEDURE — 87186 SC STD MICRODIL/AGAR DIL: CPT

## 2019-04-07 PROCEDURE — 93306 TTE W/DOPPLER COMPLETE: CPT

## 2019-04-07 PROCEDURE — 84484 ASSAY OF TROPONIN QUANT: CPT

## 2019-04-07 PROCEDURE — 85027 COMPLETE CBC AUTOMATED: CPT

## 2019-04-07 PROCEDURE — 83036 HEMOGLOBIN GLYCOSYLATED A1C: CPT

## 2019-04-07 RX ORDER — NOREPINEPHRINE BITARTRATE/D5W 8 MG/250ML
0.01 PLASTIC BAG, INJECTION (ML) INTRAVENOUS
Qty: 8 | Refills: 0 | Status: DISCONTINUED | OUTPATIENT
Start: 2019-04-07 | End: 2019-04-07

## 2019-04-07 RX ORDER — NOREPINEPHRINE BITARTRATE/D5W 8 MG/250ML
0.01 PLASTIC BAG, INJECTION (ML) INTRAVENOUS
Qty: 16 | Refills: 0 | Status: DISCONTINUED | OUTPATIENT
Start: 2019-04-07 | End: 2019-04-07

## 2019-04-07 RX ORDER — ALBUMIN HUMAN 25 %
250 VIAL (ML) INTRAVENOUS ONCE
Qty: 0 | Refills: 0 | Status: DISCONTINUED | OUTPATIENT
Start: 2019-04-07 | End: 2019-04-07

## 2019-04-07 RX ADMIN — Medication 2 DROP(S): at 05:37

## 2019-04-07 RX ADMIN — CHLORHEXIDINE GLUCONATE 15 MILLILITER(S): 213 SOLUTION TOPICAL at 05:47

## 2019-04-07 RX ADMIN — PIPERACILLIN AND TAZOBACTAM 200 GRAM(S): 4; .5 INJECTION, POWDER, LYOPHILIZED, FOR SOLUTION INTRAVENOUS at 11:17

## 2019-04-07 RX ADMIN — AMIODARONE HYDROCHLORIDE 200 MILLIGRAM(S): 400 TABLET ORAL at 05:37

## 2019-04-07 RX ADMIN — PIPERACILLIN AND TAZOBACTAM 200 GRAM(S): 4; .5 INJECTION, POWDER, LYOPHILIZED, FOR SOLUTION INTRAVENOUS at 05:36

## 2019-04-07 RX ADMIN — PANTOPRAZOLE SODIUM 40 MILLIGRAM(S): 20 TABLET, DELAYED RELEASE ORAL at 05:36

## 2019-04-07 RX ADMIN — Medication 4: at 05:48

## 2019-04-07 NOTE — PROCEDURE NOTE - NSINDICATIONS_GEN_A_CORE
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

## 2019-04-07 NOTE — PROCEDURE NOTE - NSPROCDETAILS_GEN_ALL_CORE
ultrasound guidance/positive blood return obtained via catheter/sutured in place/connected to a pressurized flush line/Seldinger technique/all materials/supplies accounted for at end of procedure/location identified, draped/prepped, sterile technique used, needle inserted/introduced/hemostasis with direct pressure, dressing applied

## 2019-04-08 LAB
-  AMPICILLIN: SIGNIFICANT CHANGE UP
-  CIPROFLOXACIN: SIGNIFICANT CHANGE UP
-  LEVOFLOXACIN: SIGNIFICANT CHANGE UP
-  NITROFURANTOIN: SIGNIFICANT CHANGE UP
-  TETRACYCLINE: SIGNIFICANT CHANGE UP
-  VANCOMYCIN: SIGNIFICANT CHANGE UP
CULTURE RESULTS: SIGNIFICANT CHANGE UP
METHOD TYPE: SIGNIFICANT CHANGE UP
ORGANISM # SPEC MICROSCOPIC CNT: SIGNIFICANT CHANGE UP
ORGANISM # SPEC MICROSCOPIC CNT: SIGNIFICANT CHANGE UP
SPECIMEN SOURCE: SIGNIFICANT CHANGE UP

## 2019-04-11 LAB
CULTURE RESULTS: SIGNIFICANT CHANGE UP
SPECIMEN SOURCE: SIGNIFICANT CHANGE UP

## 2020-10-16 NOTE — ED ADULT NURSE NOTE - NS ED NURSE RECORD ANOTHER HT AND WT
Eat healthy foods you enjoy. Apixaban/Eliquis DOES NOT have a special diet. Limit your alcohol intake.
Yes

## 2021-11-22 NOTE — ED ADULT NURSE NOTE - NSFALLRSKASSESSTYPE_ED_ALL_ED
Education/demonstration provided to patient on home sleep study.    WatchPat equipment given to patient and equipment agreement signed.    Initial (On Arrival)

## 2022-05-17 NOTE — ED ADULT NURSE NOTE - NS ED NURSE RECORD ANOTHER VITAL SIGN
MANOLO WOOD SAM  [facility]  [address]  [phone]  [email or web address]  CARDIOLOGY PROGRESS NOTE    5/17/2022 8:37 AM    Admit Date: 5/16/2022    Admit Diagnosis:   Syncope [R55]; Orthostatic hypotension [I95.1]    Subjective:     Cinda Gorman remains orthostatic. Rhythm stable. ICD interrogation normal.    Visit Vitals  BP (!) 142/62 (BP 1 Location: Right upper arm, BP Patient Position: Lying; At rest)   Pulse 92   Temp 97.5 °F (36.4 °C)   Resp 19   Ht 5' 7\" (1.702 m)   Wt 152 lb (68.9 kg)   SpO2 98%   BMI 23.81 kg/m²       Current Facility-Administered Medications   Medication Dose Route Frequency    sodium chloride (NS) flush 5-40 mL  5-40 mL IntraVENous Q8H    sodium chloride (NS) flush 5-40 mL  5-40 mL IntraVENous PRN    acetaminophen (TYLENOL) tablet 650 mg  650 mg Oral Q6H PRN    Or    acetaminophen (TYLENOL) suppository 650 mg  650 mg Rectal Q6H PRN    polyethylene glycol (MIRALAX) packet 17 g  17 g Oral DAILY PRN    ondansetron (ZOFRAN ODT) tablet 4 mg  4 mg Oral Q8H PRN    Or    ondansetron (ZOFRAN) injection 4 mg  4 mg IntraVENous Q6H PRN    atorvastatin (LIPITOR) tablet 40 mg  40 mg Oral QHS    DULoxetine (CYMBALTA) capsule 60 mg  60 mg Oral DAILY    apixaban (ELIQUIS) tablet 5 mg  5 mg Oral Q12H    memantine (NAMENDA) tablet 10 mg  10 mg Oral BID    pantoprazole (PROTONIX) tablet 40 mg  40 mg Oral DAILY    polyethylene glycol (MIRALAX) packet 17 g  17 g Oral DAILY    albuterol-ipratropium (DUO-NEB) 2.5 MG-0.5 MG/3 ML  3 mL Nebulization Q6H RT    guaiFENesin ER (MUCINEX) tablet 600 mg  600 mg Oral Q12H    midodrine (PROAMATINE) tablet 2.5 mg  2.5 mg Oral TID PRN    sacubitriL-valsartan (ENTRESTO) 24-26 mg tablet 1 Tablet  1 Tablet Oral BID    donepeziL (ARICEPT) tablet 5 mg  5 mg Oral QHS    sertraline (ZOLOFT) tablet 200 mg  200 mg Oral DAILY    mirabegron ER (MYRBETRIQ) tablet 50 mg  50 mg Oral DAILY         Objective: Physical Exam    General: Well developed, in no acute distress, cooperative and alert  HEENT: No carotid bruits, no JVD, trach is midline. Neck Supple, PERRL, EOM intact. Heart:  Normal S1/S2 negative S3 or S4. Regular, no murmur, gallop or rub. Respiratory: Clear bilaterally x 4, no wheezing or rales  Abdomen:   Soft, non-tender, no masses, bowel sounds are active. Extremities:  No edema, normal cap refill, no cyanosis, atraumatic. Neuro: A&Ox3, speech clear, gait stable. Skin: Skin color is normal. No rashes or lesions. Non diaphoretic  Vascular: 2+ pulses symmetric in all extremities      Data Review:   Recent Labs     05/17/22  0322 05/15/22  1848   WBC 7.5 7.6   HGB 10.0* 11.1*   HCT 31.9* 35.2*    252     Recent Labs     05/17/22  0322 05/15/22  1848    138   K 4.4 4.9    102   CO2 31 30   * 103*   BUN 21* 28*   CREA 0.95 1.17   CA 9.6 9.8   MG  --  2.0   ALB  --  3.3*   TBILI  --  0.4   ALT  --  18   INR  --  1.1       No results for input(s): TROIQ, CPK, CKMB in the last 72 hours. Intake/Output Summary (Last 24 hours) at 5/17/2022 0837  Last data filed at 5/17/2022 0308  Gross per 24 hour   Intake 1240 ml   Output 425 ml   Net 815 ml        Telemetry:   EKG:  Cxray:    Assessment:     Active Problems:    Syncope (2/17/2020)      Orthostatic hypotension (5/16/2022)        Plan:     Syncope- probably secondary to orthostatic hypotension. Hold entersto if needed. OT/PT. Recommend compression stockings.     Clement Odonnell MD Yes

## 2022-10-07 NOTE — CONSULT NOTE ADULT - PROVIDER SPECIALTY LIST ADULT
Cardiology
Neurology
Palliative Care
Vascular Surgery
Neurosurgery
77 yo f with htn, hld, asthma, dm presents with sob, fever, chills. ddx pna vs covid, asthma exacerbation, poss PE.  patient febrile yesterday, will initiate sepsis protocol and reassess closely.